# Patient Record
Sex: MALE | Race: WHITE | NOT HISPANIC OR LATINO | Employment: FULL TIME | URBAN - METROPOLITAN AREA
[De-identification: names, ages, dates, MRNs, and addresses within clinical notes are randomized per-mention and may not be internally consistent; named-entity substitution may affect disease eponyms.]

---

## 2019-06-11 ENCOUNTER — TELEPHONE (OUTPATIENT)
Dept: FAMILY MEDICINE CLINIC | Facility: CLINIC | Age: 49
End: 2019-06-11

## 2019-06-11 NOTE — TELEPHONE ENCOUNTER
Pt states he just returned from long road trip, has pain, tenderness and swelling in calf, he is concerned if he has a blood clot, he has had doppler previously in 2016, with Neg results.     He has appointment tomorrow with you at 1030am.     Pt wants to know if he should go to ER, get scan today or if he'll be able to wait until tomorrow.     Advise

## 2019-06-12 ENCOUNTER — OFFICE VISIT (OUTPATIENT)
Dept: FAMILY MEDICINE CLINIC | Facility: CLINIC | Age: 49
End: 2019-06-12

## 2019-06-12 ENCOUNTER — HOSPITAL ENCOUNTER (OUTPATIENT)
Dept: CARDIOLOGY | Facility: HOSPITAL | Age: 49
Discharge: HOME | End: 2019-06-12
Admitting: NURSE PRACTITIONER

## 2019-06-12 VITALS
HEIGHT: 71 IN | HEART RATE: 63 BPM | DIASTOLIC BLOOD PRESSURE: 72 MMHG | SYSTOLIC BLOOD PRESSURE: 126 MMHG | OXYGEN SATURATION: 100 % | WEIGHT: 216 LBS | BODY MASS INDEX: 30.24 KG/M2 | RESPIRATION RATE: 18 BRPM | TEMPERATURE: 97.8 F

## 2019-06-12 DIAGNOSIS — M79.604 PAIN OF RIGHT LOWER EXTREMITY: Primary | ICD-10-CM

## 2019-06-12 DIAGNOSIS — I80.201 DEEP VEIN THROMBOPHLEBITIS OF RIGHT LEG (HCC): ICD-10-CM

## 2019-06-12 LAB
BH CV LOW VAS RIGHT GASTRONEMIUS VESSEL: 1
BH CV LOWER VASCULAR LEFT COMMON FEMORAL AUGMENT: NORMAL
BH CV LOWER VASCULAR LEFT COMMON FEMORAL COMPETENT: NORMAL
BH CV LOWER VASCULAR LEFT COMMON FEMORAL COMPRESS: NORMAL
BH CV LOWER VASCULAR LEFT COMMON FEMORAL PHASIC: NORMAL
BH CV LOWER VASCULAR LEFT COMMON FEMORAL SPONT: NORMAL
BH CV LOWER VASCULAR RIGHT COMMON FEMORAL AUGMENT: NORMAL
BH CV LOWER VASCULAR RIGHT COMMON FEMORAL COMPETENT: NORMAL
BH CV LOWER VASCULAR RIGHT COMMON FEMORAL COMPRESS: NORMAL
BH CV LOWER VASCULAR RIGHT COMMON FEMORAL PHASIC: NORMAL
BH CV LOWER VASCULAR RIGHT COMMON FEMORAL SPONT: NORMAL
BH CV LOWER VASCULAR RIGHT DISTAL FEMORAL COMPRESS: NORMAL
BH CV LOWER VASCULAR RIGHT GASTRONEMIUS COMPRESS: NORMAL
BH CV LOWER VASCULAR RIGHT GASTRONEMIUS THROMBUS: NORMAL
BH CV LOWER VASCULAR RIGHT GREATER SAPH AK COMPRESS: NORMAL
BH CV LOWER VASCULAR RIGHT GREATER SAPH BK COMPRESS: NORMAL
BH CV LOWER VASCULAR RIGHT LESSER SAPH COMPRESS: NORMAL
BH CV LOWER VASCULAR RIGHT MID FEMORAL AUGMENT: NORMAL
BH CV LOWER VASCULAR RIGHT MID FEMORAL COMPETENT: NORMAL
BH CV LOWER VASCULAR RIGHT MID FEMORAL COMPRESS: NORMAL
BH CV LOWER VASCULAR RIGHT MID FEMORAL PHASIC: NORMAL
BH CV LOWER VASCULAR RIGHT MID FEMORAL SPONT: NORMAL
BH CV LOWER VASCULAR RIGHT PERONEAL COMPRESS: NORMAL
BH CV LOWER VASCULAR RIGHT POPLITEAL AUGMENT: NORMAL
BH CV LOWER VASCULAR RIGHT POPLITEAL COMPETENT: NORMAL
BH CV LOWER VASCULAR RIGHT POPLITEAL COMPRESS: NORMAL
BH CV LOWER VASCULAR RIGHT POPLITEAL PHASIC: NORMAL
BH CV LOWER VASCULAR RIGHT POPLITEAL SPONT: NORMAL
BH CV LOWER VASCULAR RIGHT POSTERIOR TIBIAL COMPRESS: NORMAL
BH CV LOWER VASCULAR RIGHT PROXIMAL FEMORAL COMPRESS: NORMAL
BH CV LOWER VASCULAR RIGHT SAPHENOFEMORAL JUNCTION AUGMENT: NORMAL
BH CV LOWER VASCULAR RIGHT SAPHENOFEMORAL JUNCTION COMPETENT: NORMAL
BH CV LOWER VASCULAR RIGHT SAPHENOFEMORAL JUNCTION COMPRESS: NORMAL
BH CV LOWER VASCULAR RIGHT SAPHENOFEMORAL JUNCTION PHASIC: NORMAL
BH CV LOWER VASCULAR RIGHT SAPHENOFEMORAL JUNCTION SPONT: NORMAL

## 2019-06-12 PROCEDURE — 93971 EXTREMITY STUDY: CPT

## 2019-06-12 PROCEDURE — 99214 OFFICE O/P EST MOD 30 MIN: CPT | Performed by: NURSE PRACTITIONER

## 2019-06-12 RX ORDER — ASPIRIN 81 MG/1
81 TABLET, CHEWABLE ORAL DAILY
COMMUNITY
End: 2019-06-13

## 2019-06-12 NOTE — PROGRESS NOTES
Right lower venous doppler complete and preliminary is  Positive for acute rt calf vein dvt to Rosa Cruz APRN. Pt. Was instructed to go back to the office now to be treated with blood thinners

## 2019-06-12 NOTE — PROGRESS NOTES
Subjective   Thomas Silvestre is a 49 y.o. male.     Chief Complaint   Patient presents with   • Leg Pain     possible dvt, pain in RIGHT calf      HPI patient is new to me.  He is here for right calf pain that began during the trip home from [place].  It began on the first day home of long car trip.  Once he developed the pain he started taking a baby aspirin and making sure they stopped more frequently for him to be able to walk.  He has never had a DVT, however he has had swelling in his right leg previously and had a negative Doppler for DVT approximately 2 years ago after traveling.  This time his leg did not swell, however he did have pain in calf which today is a little bit improved and he now notes it more in his distal posterior thigh.  He did not seek urgent treatment when he returned home.    He has never injured his right leg but did have ACL repair to his right knee about 15 years ago.  He has not had any problems since.  He does exercise by biking or running, however recently he has decreased his exercise quite a bit.  He is planning to resume this.    He has a fraternal twin brother who was diagnosed with DVT and PE recently.  His brother did have a work-up for familial clotting disorders, however patient is not sure if it was positive or negative and what blood work was done.  He is going to text his brother today to find this out.    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    He will be traveling out of town this weekend for approximately 1 week.    Social History     Tobacco Use   • Smoking status: Never Smoker   • Smokeless tobacco: Never Used   Substance Use Topics   • Alcohol use: No   • Drug use: No       The following portions of the patient's history were reviewed and updated as appropriate: allergies, current medications, past family history, past medical history, past social history, past surgical history and problem list.    Review of Systems   Constitutional:  "Negative for chills and fever.   Respiratory: Negative for cough, chest tightness, shortness of breath and wheezing.    Cardiovascular: Positive for leg swelling (right leg mildly swollen mostly in thigh area during trip). Negative for chest pain and palpitations.   Gastrointestinal: Negative for abdominal pain, diarrhea, nausea and vomiting.   Musculoskeletal: Negative for arthralgias, back pain, gait problem, joint swelling and myalgias.   Neurological: Negative for dizziness, weakness and headaches.   Hematological: Does not bruise/bleed easily.       Objective   Blood pressure 126/72, pulse 63, temperature 97.8 °F (36.6 °C), resp. rate 18, height 179.1 cm (70.5\"), weight 98 kg (216 lb), SpO2 100 %.    Physical Exam   Constitutional: He is oriented to person, place, and time. He appears well-developed and well-nourished. No distress.   HENT:   Head: Normocephalic and atraumatic.   Mouth/Throat: Oropharynx is clear and moist.   Eyes: Conjunctivae are normal. Right eye exhibits no discharge. Left eye exhibits no discharge.   Cardiovascular: Normal rate, regular rhythm, normal heart sounds and intact distal pulses.   Pulmonary/Chest: Effort normal and breath sounds normal. He has no wheezes.   Abdominal: Soft. Bowel sounds are normal.   Musculoskeletal: He exhibits edema (minimal right leg swelling, non pitting more apparent in distal thigh than lower leg) and tenderness (some mild point tenderness to right proximal calf and right medial mid calf). He exhibits no deformity.   Gait smooth and steady  No apparent erythema to right calf   Neurological: He is alert and oriented to person, place, and time.   Skin: Skin is warm and dry. He is not diaphoretic.   Psychiatric: He has a normal mood and affect.   Nursing note and vitals reviewed.      Assessment   Problem List Items Addressed This Visit     None      Visit Diagnoses     Pain of right lower extremity    -  Primary    Relevant Orders    Duplex Venous Lower " Extremity - Right CAR           Procedures           Impression and Plan:  We have ordered STAT doppler right lower leg to r/o DVT.  I do not see any obvious signs that would make me more likely suspect DVT other than patient history.  I do want him to find out about his twin brothers w/u and would like pt to return soon for physical.  We discussed s/s requiring emergent eval (PE).  We also discussed process for blood thinning if he has DVT.  He will return to office when Doppler is complete.     Wells probability score=1, putting him at moderate probability of DVT.    There are no preventive care reminders to display for this patient.      Late entry at 1620-patient returns from vascular and has positive result for DVT in right leg-I received a call and had patient return to office.  I will start xarelto today and discussed administration with him as well as side effects, s/s requiring emergent tx.  He will stop baby asa after today.  He took first dose of xarelto in exam room with me present.  I have given him a xarelto starter pack with instructions for use.  I will send RX for next 2 months of therapy.  He plans to travel on Sunday and since he will be anticoagulated I dont see a reason he cannot go unless he has some problems.  He will avoid exercise for at least the first month and see me back for f/u prior to completing first 3 months of tx.  We discussed that since this was provoked DVT due to travel that for now will plan 3 months of tx. He also tells me that his brothers DVT was also provoked by ankle fx.  I will get baseline CBC and CMP today while he is here.      EMR Dragon/Transcription disclaimer:   Much of this encounter note is an electronic transcription/translation of spoken language to printed text. The electronic translation of spoken language may permit erroneous, or at times, nonsensical words or phrases to be inadvertently transcribed; Although I have reviewed the note for such errors, some  may still exist.

## 2019-06-13 DIAGNOSIS — I80.201 DEEP VEIN THROMBOPHLEBITIS OF RIGHT LEG (HCC): Primary | ICD-10-CM

## 2019-06-13 PROBLEM — I82.409 DVT (DEEP VENOUS THROMBOSIS) (HCC): Status: ACTIVE | Noted: 2019-06-13

## 2019-06-13 LAB
ALBUMIN SERPL-MCNC: 4.2 G/DL (ref 3.5–5.2)
ALBUMIN/GLOB SERPL: 1.8 G/DL
ALP SERPL-CCNC: 71 U/L (ref 39–117)
ALT SERPL-CCNC: 23 U/L (ref 1–41)
AST SERPL-CCNC: 14 U/L (ref 1–40)
BASOPHILS # BLD AUTO: 0.07 10*3/MM3 (ref 0–0.2)
BASOPHILS NFR BLD AUTO: 1.1 % (ref 0–1.5)
BILIRUB SERPL-MCNC: 0.4 MG/DL (ref 0.2–1.2)
BUN SERPL-MCNC: 12 MG/DL (ref 6–20)
BUN/CREAT SERPL: 12.4 (ref 7–25)
CALCIUM SERPL-MCNC: 8.9 MG/DL (ref 8.6–10.5)
CHLORIDE SERPL-SCNC: 100 MMOL/L (ref 98–107)
CO2 SERPL-SCNC: 29.7 MMOL/L (ref 22–29)
CREAT SERPL-MCNC: 0.97 MG/DL (ref 0.76–1.27)
EOSINOPHIL # BLD AUTO: 0.26 10*3/MM3 (ref 0–0.4)
EOSINOPHIL NFR BLD AUTO: 4.1 % (ref 0.3–6.2)
ERYTHROCYTE [DISTWIDTH] IN BLOOD BY AUTOMATED COUNT: 13.2 % (ref 12.3–15.4)
GLOBULIN SER CALC-MCNC: 2.4 GM/DL
GLUCOSE SERPL-MCNC: 88 MG/DL (ref 65–99)
HCT VFR BLD AUTO: 44.5 % (ref 37.5–51)
HGB BLD-MCNC: 14.4 G/DL (ref 13–17.7)
IMM GRANULOCYTES # BLD AUTO: 0.04 10*3/MM3 (ref 0–0.05)
IMM GRANULOCYTES NFR BLD AUTO: 0.6 % (ref 0–0.5)
LYMPHOCYTES # BLD AUTO: 1.61 10*3/MM3 (ref 0.7–3.1)
LYMPHOCYTES NFR BLD AUTO: 25.2 % (ref 19.6–45.3)
MCH RBC QN AUTO: 30.5 PG (ref 26.6–33)
MCHC RBC AUTO-ENTMCNC: 32.4 G/DL (ref 31.5–35.7)
MCV RBC AUTO: 94.3 FL (ref 79–97)
MONOCYTES # BLD AUTO: 0.65 10*3/MM3 (ref 0.1–0.9)
MONOCYTES NFR BLD AUTO: 10.2 % (ref 5–12)
NEUTROPHILS # BLD AUTO: 3.75 10*3/MM3 (ref 1.7–7)
NEUTROPHILS NFR BLD AUTO: 58.8 % (ref 42.7–76)
NRBC BLD AUTO-RTO: 0 /100 WBC (ref 0–0.2)
PLATELET # BLD AUTO: 334 10*3/MM3 (ref 140–450)
POTASSIUM SERPL-SCNC: 4.3 MMOL/L (ref 3.5–5.2)
PROT SERPL-MCNC: 6.6 G/DL (ref 6–8.5)
RBC # BLD AUTO: 4.72 10*6/MM3 (ref 4.14–5.8)
SODIUM SERPL-SCNC: 140 MMOL/L (ref 136–145)
WBC # BLD AUTO: 6.38 10*3/MM3 (ref 3.4–10.8)

## 2019-06-13 NOTE — PROGRESS NOTES
Spoke in detail with Patient about results, patient expressed understanding and will follow up as agreed.     Any pending Labs and/or Diagnostic procedures required have been ordered for future release.    Nisa JOHN

## 2019-07-10 DIAGNOSIS — I80.201 DEEP VEIN THROMBOPHLEBITIS OF RIGHT LEG (HCC): Primary | ICD-10-CM

## 2019-08-08 ENCOUNTER — LAB (OUTPATIENT)
Dept: LAB | Facility: HOSPITAL | Age: 49
End: 2019-08-08

## 2019-08-08 ENCOUNTER — CONSULT (OUTPATIENT)
Dept: ONCOLOGY | Facility: CLINIC | Age: 49
End: 2019-08-08

## 2019-08-08 VITALS
TEMPERATURE: 97.9 F | WEIGHT: 220.7 LBS | OXYGEN SATURATION: 99 % | HEART RATE: 59 BPM | SYSTOLIC BLOOD PRESSURE: 131 MMHG | RESPIRATION RATE: 12 BRPM | DIASTOLIC BLOOD PRESSURE: 82 MMHG | BODY MASS INDEX: 30.9 KG/M2 | HEIGHT: 71 IN

## 2019-08-08 DIAGNOSIS — I80.201 DEEP VEIN THROMBOPHLEBITIS OF RIGHT LEG (HCC): ICD-10-CM

## 2019-08-08 DIAGNOSIS — I82.401 ACUTE DEEP VEIN THROMBOSIS (DVT) OF RIGHT LOWER EXTREMITY, UNSPECIFIED VEIN (HCC): Primary | ICD-10-CM

## 2019-08-08 DIAGNOSIS — Z79.01 CHRONIC ANTICOAGULATION: Primary | ICD-10-CM

## 2019-08-08 LAB
ALBUMIN SERPL-MCNC: 4.6 G/DL (ref 3.5–5.2)
ALBUMIN/GLOB SERPL: 1.7 G/DL (ref 1.1–2.4)
ALP SERPL-CCNC: 65 U/L (ref 38–116)
ALT SERPL W P-5'-P-CCNC: 21 U/L (ref 0–41)
ANION GAP SERPL CALCULATED.3IONS-SCNC: 10.7 MMOL/L (ref 5–15)
AST SERPL-CCNC: 15 U/L (ref 0–40)
AT III PPP CHRO-ACNC: 108 % (ref 90–134)
BASOPHILS # BLD AUTO: 0.09 10*3/MM3 (ref 0–0.2)
BASOPHILS NFR BLD AUTO: 1.6 % (ref 0–1.5)
BILIRUB SERPL-MCNC: 0.4 MG/DL (ref 0.2–1.2)
BUN BLD-MCNC: 11 MG/DL (ref 6–20)
BUN/CREAT SERPL: 10.8 (ref 7.3–30)
CALCIUM SPEC-SCNC: 9.2 MG/DL (ref 8.5–10.2)
CHLORIDE SERPL-SCNC: 102 MMOL/L (ref 98–107)
CO2 SERPL-SCNC: 29.3 MMOL/L (ref 22–29)
CREAT BLD-MCNC: 1.02 MG/DL (ref 0.7–1.3)
DEPRECATED RDW RBC AUTO: 42.1 FL (ref 37–54)
EOSINOPHIL # BLD AUTO: 0.26 10*3/MM3 (ref 0–0.4)
EOSINOPHIL NFR BLD AUTO: 4.6 % (ref 0.3–6.2)
ERYTHROCYTE [DISTWIDTH] IN BLOOD BY AUTOMATED COUNT: 12.7 % (ref 12.3–15.4)
F5 GENE MUT ANL BLD/T: NORMAL
FACTOR II, DNA ANALYSIS: NORMAL
GFR SERPL CREATININE-BSD FRML MDRD: 77 ML/MIN/1.73
GLOBULIN UR ELPH-MCNC: 2.7 GM/DL (ref 1.8–3.5)
GLUCOSE BLD-MCNC: 96 MG/DL (ref 74–124)
HCT VFR BLD AUTO: 43.4 % (ref 37.5–51)
HGB BLD-MCNC: 14.9 G/DL (ref 13–17.7)
IMM GRANULOCYTES # BLD AUTO: 0.03 10*3/MM3 (ref 0–0.05)
IMM GRANULOCYTES NFR BLD AUTO: 0.5 % (ref 0–0.5)
LYMPHOCYTES # BLD AUTO: 1.78 10*3/MM3 (ref 0.7–3.1)
LYMPHOCYTES NFR BLD AUTO: 31.4 % (ref 19.6–45.3)
MCH RBC QN AUTO: 31 PG (ref 26.6–33)
MCHC RBC AUTO-ENTMCNC: 34.3 G/DL (ref 31.5–35.7)
MCV RBC AUTO: 90.4 FL (ref 79–97)
MONOCYTES # BLD AUTO: 0.63 10*3/MM3 (ref 0.1–0.9)
MONOCYTES NFR BLD AUTO: 11.1 % (ref 5–12)
NEUTROPHILS # BLD AUTO: 2.87 10*3/MM3 (ref 1.7–7)
NEUTROPHILS NFR BLD AUTO: 50.8 % (ref 42.7–76)
NRBC BLD AUTO-RTO: 0 /100 WBC (ref 0–0.2)
PLATELET # BLD AUTO: 269 10*3/MM3 (ref 140–450)
PMV BLD AUTO: 9.3 FL (ref 6–12)
POTASSIUM BLD-SCNC: 4.2 MMOL/L (ref 3.5–4.7)
PROT C ACT/NOR PPP: 110 % (ref 86–163)
PROT S ACT/NOR PPP: 139 % (ref 70–127)
PROT S FREE PPP-ACNC: 104 % (ref 49–138)
PROT SERPL-MCNC: 7.3 G/DL (ref 6.3–8)
RBC # BLD AUTO: 4.8 10*6/MM3 (ref 4.14–5.8)
SODIUM BLD-SCNC: 142 MMOL/L (ref 134–145)
WBC NRBC COR # BLD: 5.66 10*3/MM3 (ref 3.4–10.8)

## 2019-08-08 PROCEDURE — 85306 CLOT INHIBIT PROT S FREE: CPT | Performed by: INTERNAL MEDICINE

## 2019-08-08 PROCEDURE — 99244 OFF/OP CNSLTJ NEW/EST MOD 40: CPT | Performed by: INTERNAL MEDICINE

## 2019-08-08 PROCEDURE — 85300 ANTITHROMBIN III ACTIVITY: CPT | Performed by: INTERNAL MEDICINE

## 2019-08-08 PROCEDURE — 80053 COMPREHEN METABOLIC PANEL: CPT | Performed by: INTERNAL MEDICINE

## 2019-08-08 PROCEDURE — 85303 CLOT INHIBIT PROT C ACTIVITY: CPT | Performed by: INTERNAL MEDICINE

## 2019-08-08 PROCEDURE — 81240 F2 GENE: CPT | Performed by: INTERNAL MEDICINE

## 2019-08-08 PROCEDURE — 36415 COLL VENOUS BLD VENIPUNCTURE: CPT

## 2019-08-08 PROCEDURE — 81241 F5 GENE: CPT | Performed by: INTERNAL MEDICINE

## 2019-08-08 PROCEDURE — 85025 COMPLETE CBC W/AUTO DIFF WBC: CPT

## 2019-08-08 NOTE — PROGRESS NOTES
Subjective     REASON FOR CONSULTATION:  RIGHT LOWER EXTREMITY GASTROCNEMIUS VEIN THROMBOSIS ASSOCIATED WITH LONG TRIP BY CAR.  Provide an opinion on any further workup or treatment                             REQUESTING PHYSICIAN:  DAHLIA VARELA    RECORDS OBTAINED:  Records of the patients history including those obtained from the referring provider were reviewed and summarized in detail.    HISTORY OF PRESENT ILLNESS:  The patient is a 49 y.o. year old male who is here for an opinion about the above issue.    History of Present Illness I have been asked to see this patient in consultation today a 49-year-old extremely healthy individual who took a family trip alone to [place] with his family and on the way back after driving for multiple hours and not moving too much in the car developed pain in the upper aspect of the right calf with local inflammation and sensitivity. When he got to [place] he was seen and a Doppler study was done documenting a vein clot in the gastrocnemius vein in the right lower extremity. He had no extension into the popliteal system. The rest of the Doppler of the lower extremity was negative. The patient had no symptoms consistent with pulmonary embolism. He was placed on Xarelto and 2 days later the pain resolved. He has occasional discomfort since then in the leg especially on days that he is too busy and walking. He has not had any discoloration of the toes. He has not had any clinical bleeding. The patient denies the use of any testosterone. The patient otherwise feels well, his weight is stable, appetite is good. He has not had any infections. Normal bowel and urinary activity, no cardiovascular or respiratory issues of any kind. No skin alterations, no joint pain. No neurological symptomatology.         Past Medical History:   Diagnosis Date   • DVT (deep venous thrombosis) (CMS/HCC)         Past Surgical History:   Procedure Laterality Date   • KNEE ACL RECONSTRUCTION      • OTHER SURGICAL HISTORY      cyst removed   • TOE SURGERY     • VASECTOMY          Current Outpatient Medications on File Prior to Visit   Medication Sig Dispense Refill   • rivaroxaban (XARELTO) 20 MG tablet Take 1 tablet by mouth Daily With Dinner. Begin after starter pack complete 30 tablet 1   • Rivaroxaban (XARELTO) tablet therapy pack starter pack Take one 15 mg tablet twice daily with food for 21 days.  Followed by one 20 mg tablet by mouth once daily with food. Take as directed 1 each 0     No current facility-administered medications on file prior to visit.         ALLERGIES:    Allergies   Allergen Reactions   • No Known Drug Allergy         Social History     Socioeconomic History   • Marital status:      Spouse name: Not on file   • Number of children: Not on file   • Years of education: Not on file   • Highest education level: Not on file   Tobacco Use   • Smoking status: Never Smoker   • Smokeless tobacco: Never Used   Substance and Sexual Activity   • Alcohol use: No   • Drug use: No   • Sexual activity: Defer        Family History   Problem Relation Age of Onset   • Hypertension Father         Review of Systems       General: no fever, no chills, no fatigue,no weight changes, no lack of appetite.  Eyes: no epiphora, xerophthalmia,conjunctivitis, pain, glaucoma, blurred vision, blindness, secretion, photophobia, proptosis, diplopia.  Ears: no otorrhea, tinnitus, otorrhagia, deafness, pain, vertigo.  Nose: no rhinorrhea, no epistaxis, no alteration in perception of odors, no sinuses pressure.  Mouth: no alteration in gums or teeth,  No ulcers, no difficulty with mastication or deglut ion, no odynophagia.  Neck: no masses or pain, no thyroid alterations, no pain in muscles or arteries, no carotid odynia, no crepitation.  Respiratory: no cough, no sputum production,no dyspnea,no trepopnea, no pleuritic pain,no hemoptysis.  Heart: no syncope, no irregularity, no palpitations, no angina,no  "orthopnea,no paroxysmal nocturnal dyspnea.  Vascular Venous: STATED tenderness, edema, palpable cords,no postphlebitic syndrome, no skin changes no ulcerations.  Vascular Arterial: no distal ischemia, noclaudication, no gangrene, no neuropathic ischemic pain, no skin ulcers, no paleness no cyanosis.  GI: no dysphagia, no odynophagia, no regurgitation, no heartburn,no indigestion,no nausea,no vomiting,no hematemesis ,no melena,no jaundice,no distention, no obstipation,no enterorrhagia,no proctalgia,no anal  lesions, no changes in bowel habits.  : no frequency, no hesitancy, no hematuria, no discharge,no  pain.  Musculoskeletal: no muscle or tendon pain or inflammation,no  joint pain, no edema, no functional limitation,no fasciculations, no mass.  Neurologic: no headache, no seizures, noalterations on Craneal nerves, no motor deficit, no sensory deficit, normal coordination, no alteration in memory,normal orientation, calculation,normal writting, verbal and written language.  Skin: no rashes,no pruritus no localized lesions.  Psychiatric: no anxiety, no depression,no agitation, no delusions, proper insight.      Objective     Vitals:    08/08/19 0841   BP: 131/82   Pulse: 59   Resp: 12   Temp: 97.9 °F (36.6 °C)   TempSrc: Oral   SpO2: 99%   Weight: 100 kg (220 lb 11.2 oz)   Height: 180 cm (70.87\")  Comment: new height   PainSc: 0-No pain     Current Status 8/8/2019   ECOG score 0       Physical Exam    GENERAL:  Well-developed, well-nourished  Patient  in no acute distress.   SKIN:  Warm, dry ,NO rashes,NO purpura ,NO petechiae.  HEENT:  Pupils were equal and reactive to light and accomodation, conjunctivas non injected, no pterigion, normal extraocular movements, normal visual acuity.   Mouth mucosa was moist, no exudates in oropharynx, normal gum line, normal roof of the mouth and pillars, normal papillations of the tongue.  NECK:  Supple with good range of motion; no thyromegaly or masses, no JVD or bruits, no " cervical adenopathies.No carotid arteries pain, no carotid abnormal pulsation , NO arterial dance.  LYMPHATICS:  No cervical, NO supraclavicular, NO axillary,NO epitrochlear , NO inguinal adenopathy.  CHEST:  Normal excursion of both darwin thoraces, normal voice fremitus, no subcutaneous emphysema, normal axillas, no rashes or acanthosis nigricans. Lungs clear to percussion and auscultation, normal breath sounds bilaterally, no wheezing,NO crackles NO ronchi, NO stridor, NO rubs.  CARDIAC AND VASCULAR:  normal rate and regular rhythm, without murmurs,NO rubs NO S3 NO S4 right or left . Normal femoral, popliteal, pedis, brachial and carotid pulses.  ABDOMEN:  Soft, nontender with no organomegaly or masses, no ascites, no collateral circulation,no distention,no Gerardo sign, no abdominal pain, no inguinal hernias,no umbilical hernia, no abdominal bruits. Normal bowel sounds.  GENITAL: Not  Performed.  EXTREMITIES  AND SPINE:  No clubbing, cyanosis or edema, no deformities or pain .No kyphosis, scoliosis, deformities or pain in spine, ribs or pelvic bone.  NEUROLOGICAL:  Patient was awake, alert, oriented to time, person and place.        RECENT LABS:  Hematology WBC   Date Value Ref Range Status   08/08/2019 5.66 3.40 - 10.80 10*3/mm3 Final   06/12/2019 6.38 3.40 - 10.80 10*3/mm3 Final     RBC   Date Value Ref Range Status   08/08/2019 4.80 4.14 - 5.80 10*6/mm3 Final   06/12/2019 4.72 4.14 - 5.80 10*6/mm3 Final     Hemoglobin   Date Value Ref Range Status   08/08/2019 14.9 13.0 - 17.7 g/dL Final     Hematocrit   Date Value Ref Range Status   08/08/2019 43.4 37.5 - 51.0 % Final     Platelets   Date Value Ref Range Status   08/08/2019 269 140 - 450 10*3/mm3 Final      Interpretation Summary     · Acute right lower extremity deep vein thrombosis noted in the gastrocnemius/soleal.  · All other right sided veins appeared normal.      Patient Hx Of Height, Weight, and Vitals     Height Weight BSA (Calculated - sq m) BMI  "(kg/m2) Pulse BP   179.1 cm (70.5\") 98 kg (216 lb) 2.17 sq meters 30.62 63 126/72   Study Impression     • Right Common Femoral: No deep vein thrombosis noted.   • Right Saphenofemoral Junction: No superficial thrombophlebitis noted.   • Right Proximal Femoral: No deep vein thrombosis noted.   • Right Mid Femoral: No deep vein thrombosis noted.   • Right Distal Femoral: No deep vein thrombosis noted.   • Right Popliteal: No deep vein thrombosis noted.   • Right Posterior Tibial: No deep vein thrombosis noted.   • Right Peroneal: No deep vein thrombosis noted.   • Right Gastrocnemius Soleal: Acute deep vein thrombosis noted.   • Right Greater Saphenous Above Knee: No superficial thrombophlebitis noted.   • Right Greater Saphenous Below Knee: No superficial thrombophlebitis noted.   • Left Common Femoral: No deep vein thrombosis noted         Assessment/Plan  In summary this very healthy 49-year-old white male who has had a blood clot in the gastrocnemius vein in the right lower extremity that was related to a long trip by car to [place]. The patient since initiation of Xarelto has done well and he had resolution of the symptoms 2 days after the initiation of the medicine. So far he has not had any symptoms that suggest postphlebitic syndrome, occasional discomfort at the clot site but he has not had any other problems.     The patient has had a brother who after twisting an ankle developed a significant vein thrombosis in the lower extremity that ended up with pulmonary embolism. According to the patient he has had blood testing that disclosed no abnormalities. Neither the patient's father or mother who are still alive have had blood clots. The patient has a sister who never had blood clots. He has 2 children who never have had blood clots. Therefore under the present premises I do believe that the patient will require continuation of Xarelto at least for a total of 3 months and I have sent him back to the lab to " do thrombophilia labs. This will include antithrombin-III level, factor V Leiden mutation, prothrombin gene mutation, protein C, protein S, lupus anticoagulant, anticardiolipin antibody profile.    I sent a new prescription for Xarelto 20 mg a day.     I advised the patient in regard to the need for protection from bleeding and trauma.     I will review him back in 2 weeks, review his laboratory parameters and make further suggestions.     I asked him to wear copper socks on his lower extremities and in future traveling trips he will require proper hydration and proper mobilization to minimize any further problems.     I discussed with patient the need for future precautions to avoid another episode of thrombosis due to thrombophilia including: Propper hydration during trips, loose clothing, frequent movement of calf, propper use of prophylactic medications, discussion with other providers about the need of communication about prophylaxis in case of need for any other surgical intervention, avoidance of hormones including androgens, estrogens and progestins, propper diet and sindy control, discontinuation of smoking, propper weight maintenance, periodic follow up in the office.I also educated patient about clinical presentation of blood cloths in Lower extremities as well Pulmonary emboli, and with this in mind, if any of the symptoms or signs arise to immediate to go to the [facility] [place] ER of make a phone call to our office, services available 24 hours a day, 365 days a year.

## 2019-08-09 LAB
CARDIOLIPIN IGG SER IA-ACNC: <9 GPL U/ML (ref 0–14)
CARDIOLIPIN IGM SER IA-ACNC: <9 MPL U/ML (ref 0–12)

## 2019-08-10 LAB
B2 GLYCOPROT1 IGA SER-ACNC: <9 GPI IGA UNITS (ref 0–25)
B2 GLYCOPROT1 IGG SER-ACNC: <9 GPI IGG UNITS (ref 0–20)
B2 GLYCOPROT1 IGM SER-ACNC: <9 GPI IGM UNITS (ref 0–32)

## 2019-08-12 LAB
DRVVT IMM 1:2 NP PPP: 68.7 SEC (ref 0–47)
LA NT PLATELET PPP: 39.4 SEC (ref 0–51.9)
LUPUS ANTICOAGULANT REFLEX: ABNORMAL
SCREEN DRVVT: 1.7 RATIO (ref 0.8–1.2)
SCREEN DRVVT: 95.4 SEC (ref 0–47)

## 2019-08-23 ENCOUNTER — APPOINTMENT (OUTPATIENT)
Dept: LAB | Facility: HOSPITAL | Age: 49
End: 2019-08-23

## 2019-08-23 ENCOUNTER — OFFICE VISIT (OUTPATIENT)
Dept: ONCOLOGY | Facility: CLINIC | Age: 49
End: 2019-08-23

## 2019-08-23 VITALS
HEIGHT: 71 IN | HEART RATE: 55 BPM | DIASTOLIC BLOOD PRESSURE: 80 MMHG | SYSTOLIC BLOOD PRESSURE: 121 MMHG | BODY MASS INDEX: 30.55 KG/M2 | WEIGHT: 218.2 LBS | TEMPERATURE: 98.6 F | RESPIRATION RATE: 12 BRPM | OXYGEN SATURATION: 98 %

## 2019-08-23 DIAGNOSIS — I82.401 ACUTE DEEP VEIN THROMBOSIS (DVT) OF RIGHT LOWER EXTREMITY, UNSPECIFIED VEIN (HCC): Primary | ICD-10-CM

## 2019-08-23 LAB
BASOPHILS # BLD AUTO: 0.06 10*3/MM3 (ref 0–0.2)
BASOPHILS NFR BLD AUTO: 0.9 % (ref 0–1.5)
DEPRECATED RDW RBC AUTO: 42.2 FL (ref 37–54)
EOSINOPHIL # BLD AUTO: 0.13 10*3/MM3 (ref 0–0.4)
EOSINOPHIL NFR BLD AUTO: 2 % (ref 0.3–6.2)
ERYTHROCYTE [DISTWIDTH] IN BLOOD BY AUTOMATED COUNT: 12.8 % (ref 12.3–15.4)
HCT VFR BLD AUTO: 44.4 % (ref 37.5–51)
HGB BLD-MCNC: 15.3 G/DL (ref 13–17.7)
IMM GRANULOCYTES # BLD AUTO: 0.02 10*3/MM3 (ref 0–0.05)
IMM GRANULOCYTES NFR BLD AUTO: 0.3 % (ref 0–0.5)
LYMPHOCYTES # BLD AUTO: 1.66 10*3/MM3 (ref 0.7–3.1)
LYMPHOCYTES NFR BLD AUTO: 26.1 % (ref 19.6–45.3)
MCH RBC QN AUTO: 31 PG (ref 26.6–33)
MCHC RBC AUTO-ENTMCNC: 34.5 G/DL (ref 31.5–35.7)
MCV RBC AUTO: 90.1 FL (ref 79–97)
MONOCYTES # BLD AUTO: 0.64 10*3/MM3 (ref 0.1–0.9)
MONOCYTES NFR BLD AUTO: 10.1 % (ref 5–12)
NEUTROPHILS # BLD AUTO: 3.84 10*3/MM3 (ref 1.7–7)
NEUTROPHILS NFR BLD AUTO: 60.6 % (ref 42.7–76)
NRBC BLD AUTO-RTO: 0 /100 WBC (ref 0–0.2)
PLATELET # BLD AUTO: 313 10*3/MM3 (ref 140–450)
PMV BLD AUTO: 9.2 FL (ref 6–12)
RBC # BLD AUTO: 4.93 10*6/MM3 (ref 4.14–5.8)
WBC NRBC COR # BLD: 6.35 10*3/MM3 (ref 3.4–10.8)

## 2019-08-23 PROCEDURE — 36416 COLLJ CAPILLARY BLOOD SPEC: CPT | Performed by: INTERNAL MEDICINE

## 2019-08-23 PROCEDURE — 99213 OFFICE O/P EST LOW 20 MIN: CPT | Performed by: INTERNAL MEDICINE

## 2019-08-23 PROCEDURE — 85025 COMPLETE CBC W/AUTO DIFF WBC: CPT | Performed by: INTERNAL MEDICINE

## 2019-08-23 NOTE — PROGRESS NOTES
Subjective     REASON FOR FOLLOW UP:  RIGHT LOWER EXTREMITY GASTROCNEMIUS VEIN THROMBOSIS ASSOCIATED WITH LONG TRIP BY CAR.      History of Present Illness I have been asked to see this patient in consultation today a 49-year-old extremely healthy individual who took a family trip alone to [place] with his family and on the way back after driving for multiple hours and not moving too much in the car developed pain in the upper aspect of the right calf with local inflammation and sensitivity. When he got to [place] he was seen and a Doppler study was done documenting a vein clot in the gastrocnemius vein in the right lower extremity. He had no extension into the popliteal system. The rest of the Doppler of the lower extremity was negative. The patient had no symptoms consistent with pulmonary embolism. He was placed on Xarelto and 2 days later the pain resolved. He has occasional discomfort since then in the leg especially on days that he is too busy and walking. He has not had any discoloration of the toes. He has not had any clinical bleeding. The patient denies the use of any testosterone. The patient otherwise feels well, his weight is stable, appetite is good. He has not had any infections. Normal bowel and urinary activity, no cardiovascular or respiratory issues of any kind. No skin alterations, no joint pain. No neurological symptomatology.     The patient returned to the office on 08/23/2019 in order to review his thrombophilia labs. In the interim he has not had any new issues in his right lower extremity, he remains on his Xarelto and he has not had any clinical bleeding. His appetite, weight, bowel function and urination remain normal. No cardiovascular or respiratory issues, no pain, no fever, no infections.         Past Medical History:   Diagnosis Date   • DVT (deep venous thrombosis) (CMS/HCC)         Past Surgical History:   Procedure Laterality Date   • KNEE ACL RECONSTRUCTION  2002   • OTHER  SURGICAL HISTORY      cyst removed   • TOE SURGERY  2015   • VASECTOMY          Current Outpatient Medications on File Prior to Visit   Medication Sig Dispense Refill   • rivaroxaban (XARELTO) 20 MG tablet Take 1 tablet by mouth Daily With Dinner. 30 tablet 2     No current facility-administered medications on file prior to visit.         ALLERGIES:    Allergies   Allergen Reactions   • No Known Drug Allergy         Social History     Socioeconomic History   • Marital status:      Spouse name: Shima   • Number of children: 2   • Years of education: College   • Highest education level: Not on file   Occupational History   • Occupation: IT     Employer: [employer]   Tobacco Use   • Smoking status: Never Smoker   • Smokeless tobacco: Never Used   Substance and Sexual Activity   • Alcohol use: Yes     Comment: Occasional   • Drug use: No   • Sexual activity: Defer        Family History   Problem Relation Age of Onset   • Hypertension Father    • Pulmonary embolism Brother         Review of Systems         General: no fever, no chills, no fatigue,no weight changes, no lack of appetite.  Eyes: no epiphora, xerophthalmia,conjunctivitis, pain, glaucoma, blurred vision, blindness, secretion, photophobia, proptosis, diplopia.  Ears: no otorrhea, tinnitus, otorrhagia, deafness, pain, vertigo.  Nose: no rhinorrhea, no epistaxis, no alteration in perception of odors, no sinuses pressure.  Mouth: no alteration in gums or teeth,  No ulcers, no difficulty with mastication or deglut ion, no odynophagia.  Neck: no masses or pain, no thyroid alterations, no pain in muscles or arteries, no carotid odynia, no crepitation.  Respiratory: no cough, no sputum production,no dyspnea,no trepopnea, no pleuritic pain,no hemoptysis.  Heart: no syncope, no irregularity, no palpitations, no angina,no orthopnea,no paroxysmal nocturnal dyspnea.  Vascular Venous: no tenderness,no edema,no palpable cords,no postphlebitic  "syndrome, no skin changes no ulcerations.  Vascular Arterial: no distal ischemia, noclaudication, no gangrene, no neuropathic ischemic pain, no skin ulcers, no paleness no cyanosis.  GI: no dysphagia, no odynophagia, no regurgitation, no heartburn,no indigestion,no nausea,no vomiting,no hematemesis ,no melena,no jaundice,no distention, no obstipation,no enterorrhagia,no proctalgia,no anal  lesions, no changes in bowel habits.  : no frequency, no hesitancy, no hematuria, no discharge,no  pain.  Musculoskeletal: no muscle or tendon pain or inflammation,no  joint pain, no edema, no functional limitation,no fasciculations, no mass.  Neurologic: no headache, no seizures, noalterations on Craneal nerves, no motor deficit, no sensory deficit, normal coordination, no alteration in memory,normal orientation, calculation,normal writting, verbal and written language.  Skin: no rashes,no pruritus no localized lesions.  Psychiatric: no anxiety, no depression,no agitation, no delusions, proper insight.        Objective     Vitals:    08/23/19 1341   BP: 121/80   Pulse: 55   Resp: 12   Temp: 98.6 °F (37 °C)   TempSrc: Oral   SpO2: 98%   Weight: 99 kg (218 lb 3.2 oz)   Height: 180 cm (70.87\")   PainSc: 0-No pain     Current Status 8/23/2019   ECOG score 0       Physical Exam        GENERAL:  Well-developed, well-nourished  Patient  in no acute distress.   SKIN:  Warm, dry ,NO rashes,NO purpura ,NO petechiae.  HEENT:  Pupils were equal and reactive to light and accomodation, conjunctivas non injected, no pterigion, normal extraocular movements, normal visual acuity.   Mouth mucosa was moist, no exudates in oropharynx, normal gum line, normal roof of the mouth and pillars, normal papillations of the tongue.  NECK:  Supple with good range of motion; no thyromegaly or masses, no JVD or bruits, no cervical adenopathies.No carotid arteries pain, no carotid abnormal pulsation , NO arterial dance.  LYMPHATICS:  No cervical, NO " supraclavicular, NO axillary,NO epitrochlear , NO inguinal adenopathy.  CHEST:  Normal excursion of both darwin thoraces, normal voice fremitus, no subcutaneous emphysema, normal axillas, no rashes or acanthosis nigricans. Lungs clear to percussion and auscultation, normal breath sounds bilaterally, no wheezing,NO crackles NO ronchi, NO stridor, NO rubs.  CARDIAC AND VASCULAR:  normal rate and regular rhythm, without murmurs,NO rubs NO S3 NO S4 right or left . Normal femoral, popliteal, pedis, brachial and carotid pulses.  ABDOMEN:  Soft, nontender with no organomegaly or masses, no ascites, no collateral circulation,no distention,no Gerardo sign, no abdominal pain, no inguinal hernias,no umbilical hernia, no abdominal bruits. Normal bowel sounds.  GENITAL: Not  Performed.  EXTREMITIES  AND SPINE:  No clubbing, cyanosis or edema, no deformities or pain .No kyphosis, scoliosis, deformities or pain in spine, ribs or pelvic bone.  NEUROLOGICAL:  Patient was awake, alert, oriented to time, person and place.              RECENT LABS:  Hematology WBC   Date Value Ref Range Status   08/23/2019 6.35 3.40 - 10.80 10*3/mm3 Final   06/12/2019 6.38 3.40 - 10.80 10*3/mm3 Final     RBC   Date Value Ref Range Status   08/23/2019 4.93 4.14 - 5.80 10*6/mm3 Final   06/12/2019 4.72 4.14 - 5.80 10*6/mm3 Final     Hemoglobin   Date Value Ref Range Status   08/23/2019 15.3 13.0 - 17.7 g/dL Final     Hematocrit   Date Value Ref Range Status   08/23/2019 44.4 37.5 - 51.0 % Final     Platelets   Date Value Ref Range Status   08/23/2019 313 140 - 450 10*3/mm3 Final            Antithrombin Activity  90 - 134 % 108                Beta-2 Glycoprotein Antibodies   Order: [id number]   Collected:  8/8/2019 09:23   View Full Report  Component  Ref Range & Units 2wk ago   Beta-2 Glyco 1 IgG  0 - 20 GPI IgG units <9    Comment: The reference interval reflects a 3SD or 99th percentile interval,   which is thought to represent a potentially clinically  significant   result in accordance with the International Consensus Statement on   the classification criteria for definitive antiphospholipid syndrome   (APS). J Thromb Haem 2006;4:295-306.   Beta-2 Glyco 1 IgA  0 - 25 GPI IgA units <9    Comment: The reference interval reflects a 3SD or 99th percentile interval,   which is thought to represent a potentially clinically significant   result in accordance with the International Consensus Statement on   the classification criteria for definitive antiphospholipid syndrome   (APS). J Thromb Haem 2006;4:295-306.   Beta-2 Glyco 1 IgM  0 - 32 GPI IgM units <9    Comment: The reference interval reflects a 3SD or 99th percentile interval,   which is thought to represent a potentially clinically significant   result in accordance with the International Consensus Statement on   the classification criteria for definitive antiphospholipid syndrome   (APS). J Thromb Haem 2006;4:295-306.      Narrative     Performed at:  [address] [place] [address]  [id number]  : Ivonne Gaines MD, Phone:  [phone]         Related Result Highlights         Anticardiolipin Antibody, IgG / M, Qn  Final result 8/8/2019                  Contains abnormal data Lupus Anticoagulant Reflex   Order: [id number]   Collected:  8/8/2019 09:23   View Full Report  Component  Ref Range & Units 2wk ago   PTT Lupus Anticoagulant  0.0 - 51.9 sec 39.4    Dilute Viper Venom Time  0.0 - 47.0 sec 95.4 Abnormally high     Lupus Anticoagulant Reflex Comment:    Comment: Results are consistent with the presence of a lupus anticoagulant.   NOTE: Only persistent lupus anticoagulants are thought to be of clinical   significance. For this reason, repeat testing in 12 or more weeks after an   initial positive result should be considered to confirm or refute the   presence of a lupus anticoagulant, depending on clinical presentation.   Results of lupus anticoagulant tests may be falsely  positive in the   presence of certain anticoagulant therapies.      Narrative     Performed at:  [address] [place] [address]  [id number]  : Ivonne Gaines MD, Phone:  [phone]            Contains abnormal data Reflexed DRVVT Mix   Order: [id number] - Reflex for Order [id number]   Collected:  8/8/2019 09:23   View Full Report  Component  Ref Range & Units 2wk ago   Dilute Viper Venom 1:1 Mix  0.0 - 47.0 sec 68.7 Abnormally high        Narrative     Performed at:  [address] [place] [address]  [id number]  : Ivonne Gaines MD, Phone:  [phone]            Contains abnormal data Reflexed DRVVT Confirm   Order: [id number] - Reflex for Order [id number]   Collected:  8/8/2019 09:23   View Full Report  Component  Ref Range & Units 2wk ago   Dilute Viper Venom Time  0.8 - 1.2 ratio 1.7 Abnormally high        Narrative     Performed at:  [address] [place] [address]  [id number]  : Ivonne Gaines MD, Phone:  [phone]            Anticardiolipin Antibody, IgG / M, Qn   Order: [id number]   Collected:  8/8/2019 09:23   View Full Report  Component  Ref Range & Units 2wk ago   Anticardiolipin IgG  0 - 14 GPL U/mL <9    Comment:                           Negative:              <15                             Indeterminate:     15 - 20                             Low-Med Positive: >20 - 80                             High Positive:         >80   Anticardiolipin IgM  0 - 12 MPL U/mL <9    Comment:                           Negative:              <13                             Indeterminate:     13 - 20                             Low-Med Positive: >20 - 80                             High Positive:         >80      Narrative     Performed at:  [address] [place] [address]  [id number]  : Morris Walker PhD, Phone:  [phone]         Related Result Highlights         Beta-2  Glycoprotein Antibodies  Final result 8/8/2019                  Protein S Antigen, Free   Order: [id number]   Collected:  8/8/2019 09:23   View Full Report  Component  Ref Range & Units 2wk ago   Protein S Ag, Free  49.0 - 138.0 % 104.0       Narrative     Deficiency of Protein S is associated with a high risk of developing venous thromboembolism, especially in young adults.  Acquired deficiencies of Protein S are associated with pregnancy, hepatic disorder, oral anticoagulant therapy, disseminated intravascular coagulation (DIC), newborns, and other clinical conditions.            Contains abnormal data Protein S Functional   Order: [id number]   Collected:  8/8/2019 09:23   View Full Report  Component  Ref Range & Units 2wk ago   Protein S Functional  70 - 127 % 139 Abnormally high        Narrative     Lupus anticoagulants and/or anti-phospholipid antibodies (APA) have been noted to interfere in the assay. Highly elevated levels of Factor VIII (greater than 250%) may lead to an under-estimation of the functional protein S level. Thrombin inhibitors and heparin may lead to an over-estimation of the functional protein S level.            Protein C Activity   Order: [id number]   Collected:  8/8/2019 09:23   View Full Report  Component  Ref Range & Units 2wk ago   Protein C Activity  86 - 163 % 110       Narrative     Deficiency of Protein C is associated with recurrent venous thrombosis, especially in young adults.  Acquired deficiencies of Protein C are associated with hepatic disorder, oral anticoagulant therapy, and disseminated intravascular coagulation (DIC).            Factor II, DNA Analysis   Order: [id number]   Collected:  8/8/2019 09:23   View Full Report  Component  Ref Range & Units 2wk ago   Factor II, DNA Analysis  Normal Normal       Narrative     A point mutation ([id number]) in the factor II (prothrombin) gene is the second most common cause of inherited thrombophilia. The incidence of this mutation in the  [place]  population is about 2% and in the  population it is approximately 0.5%. This mutation is rare in the  and  population. Being heterozygous for a prothrombin mutation increases the risk for developing venous thrombosis about 2 to 3 times above the general population risk. Being homozygous for the prothrombin gene mutation increases the relative risk for venous thrombosis further, although it is not yet known how   much further the risk is increased. In women heterozygous for the prothrombin gene mutation, the use of estrogen containing oral contraceptives increases the relative risk of venous thrombosis about 16 times and the risk of developing cerebral thrombosis is also significantly increased. In pregnancy, the prothrombin gene mutation increases risk for venous thrombosis and may increase risk for stillbirth, placental abruption, pre-eclampsia and fetal growth restriction.     The expression of Factor II thrombophilia is impacted by coexisting genetic thrombophilic disorders, acquired thrombophilic disorders (eg, malignancy, hyperhomocysteinemia, high factor VIII levels), and circumstances including: pregnancy, oral contraceptive use, hormone replacement therapy, selective estrogen receptor modulators, travel, central venous catheters, surgery, and organ transplantation.            Factor 5 Leiden   Order: [id number]   Collected:  8/8/2019 09:23   View Full Report  Component  Ref Range & Units 2wk ago   Factor V Leiden  Normal Normal       Narrative     Factor V Leiden is a specific mutation (R506Q) in the factor V gene that is associated with an increased risk of venous thrombosis. Factor V Leiden is more resistant to inactivation by activated protein C.  As a result, factor V persists in the circulation leading to a mild hyper-   coagulable state.  The Leiden mutation accounts for 90% - 95% of APC resistance.  Factor V Leiden has been reported in patients with  deep vein thrombosis, pulmonary embolus,   central retinal vein occlusion, cerebral sinus thrombosis and hepatic vein thrombosis. Other risk factors to be considered in the workup for venous thrombosis include the [id number] mutation in the factor II (prothrombin) gene, protein S and C deficiency, and antithrombin deficiencies.   Anticardiolipin antibody and lupus anticoagulant analysis may be appropriate for certain patients, as well as homocysteine levels.    The expression of Factor V Leiden thrombophilia is impacted by coexisting genetic thrombophilic disorders, acquired thrombophilic disorders (malignancy, hyperhomocysteinemia, high factor VIII levels), and circumstances including: pregnancy, oral contraceptive use, hormone replacement therapy, selective estrogen receptor modulators, travel, central venous catheters, surgery, transplantation and advanced age.            Contains abnormal data Reflexed DRVVT Confirm   Order: [id number] - Reflex for Order [id number]   Collected:  8/8/2019 09:23   View Full Report  Component  Ref Range & Units 2wk ago   Dilute Viper Venom Time  0.8 - 1.2 ratio 1.7 Abnormally high        Narrative     Performed at:  [address] [place] [address]  [id number]  : Ivonne Gaines MD, Phone:  [phone]            Contains abnormal data Lupus Anticoagulant Reflex   Order: [id number]   Collected:  8/8/2019 09:23   View Full Report  Component  Ref Range & Units 2wk ago   PTT Lupus Anticoagulant  0.0 - 51.9 sec 39.4    Dilute Viper Venom Time  0.0 - 47.0 sec 95.4 Abnormally high     Lupus Anticoagulant Reflex Comment:    Comment: Results are consistent with the presence of a lupus anticoagulant.   NOTE: Only persistent lupus anticoagulants are thought to be of clinical   significance. For this reason, repeat testing in 12 or more weeks after an   initial positive result should be considered to confirm or refute the   presence of a lupus anticoagulant,  depending on clinical presentation.   Results of lupus anticoagulant tests may be falsely positive in the   presence of certain anticoagulant therapies.      Narrative     Performed at:  [address] [place] [address]  [id number]  : Ivonne Gaines MD, Phone:  [phone]            Contains abnormal data Reflexed DRVVT Mix   Order: [id number] - Reflex for Order [id number]   Collected:  8/8/2019 09:23   View Full Report  Component  Ref Range & Units 2wk ago   Dilute Viper Venom 1:1 Mix  0.0 - 47.0 sec 68.7 Abnormally high        Narrative     Performed at:  [address] [place] [address]  [id number]  : Ivonne Gaines MD, Phone:  [phone]            Contains abnormal data Reflexed DRVVT Mix   Order: [id number] - Reflex for Order [id number]   Collected:  8/8/2019 09:23   View Full Report  Component  Ref Range & Units 2wk ago   Dilute Viper Venom 1:1 Mix  0.0 - 47.0 sec 68.7 Abnormally high        Narrative     Performed at:  [address] [place] [address]  [id number]  : Ivonne Gaines MD, Phone:  [phone]            Contains abnormal data Lupus Anticoagulant Reflex   Order: [id number]   Collected:  8/8/2019 09:23   View Full Report  Component  Ref Range & Units 2wk ago   PTT Lupus Anticoagulant  0.0 - 51.9 sec 39.4    Dilute Viper Venom Time  0.0 - 47.0 sec 95.4 Abnormally high     Lupus Anticoagulant Reflex Comment:    Comment: Results are consistent with the presence of a lupus anticoagulant.   NOTE: Only persistent lupus anticoagulants are thought to be of clinical   significance. For this reason, repeat testing in 12 or more weeks after an   initial positive result should be considered to confirm or refute the   presence of a lupus anticoagulant, depending on clinical presentation.   Results of lupus anticoagulant tests may be falsely positive in the   presence of certain anticoagulant therapies.       Narrative     Performed at:   - [facility]  [address] [place], [place]  [id number]  : Ivonne Gaines MD, Phone:  [phone]            Contains abnormal data Reflexed DRVVT Confirm   Order: [id number] - Reflex for Order [id number]   Collected:  8/8/2019 09:23   View Full Report  Component  Ref Range & Units 2wk ago   Dilute Viper Venom Time  0.8 - 1.2 ratio 1.7 Abnormally high        Narrative     Performed at:  01 - [facility]  [address] [place], [place]  [id number]  : Ivonne Gaines MD, Phone:  [phone]                      Assessment/Plan  In summary this very healthy 49-year-old white male who has had a blood clot in the gastrocnemius vein in the right lower extremity that was related to a long trip by car to [place]. The patient since initiation of Xarelto has done well and he had resolution of the symptoms 2 days after the initiation of the medicine. So far he has not had any symptoms that suggest postphlebitic syndrome, occasional discomfort at the clot site but he has not had any other problems.     The patient has had a brother who after twisting an ankle developed a significant vein thrombosis in the lower extremity that ended up with pulmonary embolism. According to the patient he has had blood testing that disclosed no abnormalities. Neither the patient's father or mother who are still alive have had blood clots. The patient has a sister who never had blood clots. He has 2 children who never have had blood clots. Therefore under the present premises I do believe that the patient will require continuation of Xarelto at least for a total of 3 months    I reviewed with the patient on 08/23/2019 his thrombophilia labs. The only thing that shows up with abnormality is a positive lupus anticoagulant. The rest of the labs including factor V Leiden mutation, prothrombin gene mutation, protein C, protein S, antithrombin-III, anti-beta glycoprotein antibodies  and anticardiolipin antibodies were negative.     I pointed out to the patient that sometimes lupus anticoagulant comes and goes and sometimes is responsible for blood clots not only in the venous circulation but also in the arterial circulation. Even there is information that new anticoagulants can favor a lupus anticoagulant to appear in laboratory testing without triggering any definitive coagulant phenomenon. Therefore I do believe that suggested by protocol a lupus anticoagulant needs to be retested again in 3 months to confirm or deny the presence of this and the correlation with the previous clot. To my eyes the most likely reason why he had a clot was the long trip by car.     RECOMMENDATIONS:  1. He will remain on Xarelto 20 mg a day for the time being until I review him back in 3 months.  2. He will be protecting himself from trauma to minimize bleeding.  3. He will come back in 10 weeks, proceed with lupus anticoagulant testing and review him back a week later.     If the lupus anticoagulant is negative very likely we will recommend stopping the Xarelto and going to an aspirin.    The patient asked the question about any food that he could have to minimize clots. I think he is a very healthy eater. I do not think he needs to modify his habits and I suggested a plant based diet with physical activity.

## 2019-11-01 ENCOUNTER — LAB (OUTPATIENT)
Dept: LAB | Facility: HOSPITAL | Age: 49
End: 2019-11-01

## 2019-11-01 DIAGNOSIS — I82.401 ACUTE DEEP VEIN THROMBOSIS (DVT) OF RIGHT LOWER EXTREMITY, UNSPECIFIED VEIN (HCC): Primary | ICD-10-CM

## 2019-11-01 LAB
BASOPHILS # BLD AUTO: 0.07 10*3/MM3 (ref 0–0.2)
BASOPHILS NFR BLD AUTO: 1.1 % (ref 0–1.5)
DEPRECATED RDW RBC AUTO: 44.3 FL (ref 37–54)
EOSINOPHIL # BLD AUTO: 0.23 10*3/MM3 (ref 0–0.4)
EOSINOPHIL NFR BLD AUTO: 3.7 % (ref 0.3–6.2)
ERYTHROCYTE [DISTWIDTH] IN BLOOD BY AUTOMATED COUNT: 13.1 % (ref 12.3–15.4)
HCT VFR BLD AUTO: 43.1 % (ref 37.5–51)
HGB BLD-MCNC: 14.5 G/DL (ref 13–17.7)
IMM GRANULOCYTES # BLD AUTO: 0.02 10*3/MM3 (ref 0–0.05)
IMM GRANULOCYTES NFR BLD AUTO: 0.3 % (ref 0–0.5)
LYMPHOCYTES # BLD AUTO: 1.67 10*3/MM3 (ref 0.7–3.1)
LYMPHOCYTES NFR BLD AUTO: 26.9 % (ref 19.6–45.3)
MCH RBC QN AUTO: 30.9 PG (ref 26.6–33)
MCHC RBC AUTO-ENTMCNC: 33.6 G/DL (ref 31.5–35.7)
MCV RBC AUTO: 91.7 FL (ref 79–97)
MONOCYTES # BLD AUTO: 0.62 10*3/MM3 (ref 0.1–0.9)
MONOCYTES NFR BLD AUTO: 10 % (ref 5–12)
NEUTROPHILS # BLD AUTO: 3.59 10*3/MM3 (ref 1.7–7)
NEUTROPHILS NFR BLD AUTO: 58 % (ref 42.7–76)
NRBC BLD AUTO-RTO: 0 /100 WBC (ref 0–0.2)
PLATELET # BLD AUTO: 294 10*3/MM3 (ref 140–450)
PMV BLD AUTO: 9 FL (ref 6–12)
RBC # BLD AUTO: 4.7 10*6/MM3 (ref 4.14–5.8)
WBC NRBC COR # BLD: 6.2 10*3/MM3 (ref 3.4–10.8)

## 2019-11-01 PROCEDURE — 36415 COLL VENOUS BLD VENIPUNCTURE: CPT

## 2019-11-01 PROCEDURE — 85025 COMPLETE CBC W/AUTO DIFF WBC: CPT

## 2019-11-15 ENCOUNTER — APPOINTMENT (OUTPATIENT)
Dept: LAB | Facility: HOSPITAL | Age: 49
End: 2019-11-15

## 2019-11-15 ENCOUNTER — OFFICE VISIT (OUTPATIENT)
Dept: ONCOLOGY | Facility: CLINIC | Age: 49
End: 2019-11-15

## 2019-11-15 VITALS
WEIGHT: 216.2 LBS | RESPIRATION RATE: 16 BRPM | DIASTOLIC BLOOD PRESSURE: 80 MMHG | SYSTOLIC BLOOD PRESSURE: 115 MMHG | TEMPERATURE: 98 F | OXYGEN SATURATION: 95 % | HEART RATE: 66 BPM | HEIGHT: 71 IN | BODY MASS INDEX: 30.27 KG/M2

## 2019-11-15 DIAGNOSIS — I82.401 ACUTE DEEP VEIN THROMBOSIS (DVT) OF RIGHT LOWER EXTREMITY, UNSPECIFIED VEIN (HCC): Primary | ICD-10-CM

## 2019-11-15 PROCEDURE — 99213 OFFICE O/P EST LOW 20 MIN: CPT | Performed by: INTERNAL MEDICINE

## 2019-11-15 PROCEDURE — G0463 HOSPITAL OUTPT CLINIC VISIT: HCPCS | Performed by: INTERNAL MEDICINE

## 2019-11-15 NOTE — PROGRESS NOTES
Subjective     REASON FOR FOLLOW UP:  RIGHT LOWER EXTREMITY GASTROCNEMIUS VEIN THROMBOSIS ASSOCIATED WITH LONG TRIP BY CAR.      History of Present Illness This patient returns today to the office for followup of his previous history of gastrocnemius vein clot undergoing anticoagulation with Xarelto and previous positive lupus anticoagulant. He came a few days ago to have a repeated lupus anticoagulant. Unfortunately [facility] is backed up in regard to the testing and they do not have a report available in this regard yet. Overall the patient continues doing fine. He has not had any clinical bleeding, no new clots. His appetite has been good, his weight is stable. His bowel function and urination remain normal. He has not had any other new pertinent issues.             Past Medical History:   Diagnosis Date   • DVT (deep venous thrombosis) (CMS/HCC)         Past Surgical History:   Procedure Laterality Date   • KNEE ACL RECONSTRUCTION  2002   • OTHER SURGICAL HISTORY      cyst removed   • TOE SURGERY  2015   • VASECTOMY          Current Outpatient Medications on File Prior to Visit   Medication Sig Dispense Refill   • rivaroxaban (XARELTO) 20 MG tablet Take 1 tablet by mouth Daily With Dinner. 30 tablet 2     No current facility-administered medications on file prior to visit.         ALLERGIES:    Allergies   Allergen Reactions   • No Known Drug Allergy         Social History     Socioeconomic History   • Marital status:      Spouse name: Shima   • Number of children: 2   • Years of education: College   • Highest education level: Not on file   Occupational History   • Occupation: IT     Employer: [employer]   Tobacco Use   • Smoking status: Never Smoker   • Smokeless tobacco: Never Used   Substance and Sexual Activity   • Alcohol use: Yes     Comment: Occasional   • Drug use: No   • Sexual activity: Defer        Family History   Problem Relation Age of Onset   •  Hypertension Father    • Pulmonary embolism Brother         Review of Systems       General: no fever, no chills, no fatigue,no weight changes, no lack of appetite.  Eyes: no epiphora, xerophthalmia,conjunctivitis, pain, glaucoma, blurred vision, blindness, secretion, photophobia, proptosis, diplopia.  Ears: no otorrhea, tinnitus, otorrhagia, deafness, pain, vertigo.  Nose: no rhinorrhea, no epistaxis, no alteration in perception of odors, no sinuses pressure.  Mouth: no alteration in gums or teeth,  No ulcers, no difficulty with mastication or deglut ion, no odynophagia.  Neck: no masses or pain, no thyroid alterations, no pain in muscles or arteries, no carotid odynia, no crepitation.  Respiratory: no cough, no sputum production,no dyspnea,no trepopnea, no pleuritic pain,no hemoptysis.  Heart: no syncope, no irregularity, no palpitations, no angina,no orthopnea,no paroxysmal nocturnal dyspnea.  Vascular Venous: no tenderness,no edema,no palpable cords,no postphlebitic syndrome, no skin changes no ulcerations.  Vascular Arterial: no distal ischemia, noclaudication, no gangrene, no neuropathic ischemic pain, no skin ulcers, no paleness no cyanosis.  GI: no dysphagia, no odynophagia, no regurgitation, no heartburn,no indigestion,no nausea,no vomiting,no hematemesis ,no melena,no jaundice,no distention, no obstipation,no enterorrhagia,no proctalgia,no anal  lesions, no changes in bowel habits.  : no frequency, no hesitancy, no hematuria, no discharge,no  pain.  Musculoskeletal: no muscle or tendon pain or inflammation,no  joint pain, no edema, no functional limitation,no fasciculations, no mass.  Neurologic: no headache, no seizures, noalterations on Craneal nerves, no motor deficit, no sensory deficit, normal coordination, no alteration in memory,normal orientation, calculation,normal writting, verbal and written language.  Skin: no rashes,no pruritus no localized lesions.  Psychiatric: no anxiety, no depression,no  "agitation, no delusions, proper insight.          Objective     Vitals:    11/15/19 1023   BP: 115/80   Pulse: 66   Resp: 16   Temp: 98 °F (36.7 °C)   TempSrc: Oral   SpO2: 95%   Weight: 98.1 kg (216 lb 3.2 oz)   Height: 180 cm (70.87\")   PainSc: 0-No pain     Current Status 11/15/2019   ECOG score 0       Physical Exam    GENERAL:  Well-developed, well-nourished  Patient  in no acute distress.   SKIN:  Warm, dry ,NO rashes,NO purpura ,NO petechiae.  HEENT:  Pupils were equal and reactive to light and accomodation, conjunctivas non injected, no pterigion, normal extraocular movements, normal visual acuity.   Mouth mucosa was moist, no exudates in oropharynx, normal gum line, normal roof of the mouth and pillars, normal papillations of the tongue.  NECK:  Supple with good range of motion; no thyromegaly or masses, no JVD or bruits, no cervical adenopathies.No carotid arteries pain, no carotid abnormal pulsation , NO arterial dance.  LYMPHATICS:  No cervical, NO supraclavicular, NO axillary,NO epitrochlear , NO inguinal adenopathy.  CHEST:  Normal excursion of both darwin thoraces, normal voice fremitus, no subcutaneous emphysema, normal axillas, no rashes or acanthosis nigricans. Lungs clear to percussion and auscultation, normal breath sounds bilaterally, no wheezing,NO crackles NO ronchi, NO stridor, NO rubs.  CARDIAC AND VASCULAR:  normal rate and regular rhythm, without murmurs,NO rubs NO S3 NO S4 right or left . Normal femoral, popliteal, pedis, brachial and carotid pulses.  ABDOMEN:  Soft, nontender with no organomegaly or masses, no ascites, no collateral circulation,no distention,no Gerardo sign, no abdominal pain, no inguinal hernias,no umbilical hernia, no abdominal bruits. Normal bowel sounds.  GENITAL: Not  Performed.  EXTREMITIES  AND SPINE:  No clubbing, cyanosis or edema, no deformities or pain .No kyphosis, scoliosis, deformities or pain in spine, ribs or pelvic bone.  NEUROLOGICAL:  Patient was awake, " alert, oriented to time, person and place.                        RECENT LABS:  Hematology WBC   Date Value Ref Range Status   11/01/2019 6.20 3.40 - 10.80 10*3/mm3 Final   06/12/2019 6.38 3.40 - 10.80 10*3/mm3 Final     RBC   Date Value Ref Range Status   11/01/2019 4.70 4.14 - 5.80 10*6/mm3 Final   06/12/2019 4.72 4.14 - 5.80 10*6/mm3 Final     Hemoglobin   Date Value Ref Range Status   11/01/2019 14.5 13.0 - 17.7 g/dL Final     Hematocrit   Date Value Ref Range Status   11/01/2019 43.1 37.5 - 51.0 % Final     Platelets   Date Value Ref Range Status   11/01/2019 294 140 - 450 10*3/mm3 Final              Assessment/Plan  In summary this very healthy 49-year-old white male who has had a blood clot in the gastrocnemius vein in the right lower extremity that was related to a long trip by car to [place]. The patient since initiation of Xarelto has done well and he had resolution of the symptoms 2 days after the initiation of the medicine. So far he has not had any symptoms that suggest postphlebitic syndrome, occasional discomfort at the clot site but he has not had any other problems.     The patient has had a brother who after twisting an ankle developed a significant vein thrombosis in the lower extremity that ended up with pulmonary embolism. According to the patient he has had blood testing that disclosed no abnormalities. Neither the patient's father or mother who are still alive have had blood clots. The patient has a sister who never had blood clots. He has 2 children who never have had blood clots. Therefore under the present premises I do believe that the patient will require continuation of Xarelto at least for a total of 3 months    I reviewed with the patient on 08/23/2019 his thrombophilia labs. The only thing that shows up with abnormality is a positive lupus anticoagulant. The rest of the labs including factor V Leiden mutation, prothrombin gene mutation, protein C, protein S, antithrombin-III,  anti-beta glycoprotein antibodies and anticardiolipin antibodies were negative.     On reviewing the patient on 11/15/2019 he has not had any new episodes of clots, he has not had any problems with the Xarelto that he takes on a regular basis 100% compliance. Again no clinical bleeding. Laboratory workup including a CBC that is normal. On the other hand his lupus anticoagulant is still pending. [facility] lab [facility] and the test will be available at some point next week.    I discussed with the patient and his wife today the fact of the delay in the test and I apologized for that. On the other hand I would like for him to remain on his Xarelto. I will talk with him on the telephone about the testing once that it becomes available about the report and decide how to proceed at that point. If his lupus anticoagulant remains positive the patient will remain on Xarelto. If the lupus anticoagulant is negative plans will be made for him to stop the Xarelto.     I discussed this with his wife in detail.

## 2019-11-19 ENCOUNTER — TELEPHONE (OUTPATIENT)
Dept: ONCOLOGY | Facility: CLINIC | Age: 49
End: 2019-11-19

## 2019-11-19 LAB
APTT HEX PL PPP: 0 SEC
APTT IMM NP PPP: ABNORMAL S
APTT PPP 1:1 SALINE: ABNORMAL S
APTT PPP: 24.8 SEC
B2 GLYCOPROT1 IGA SER-ACNC: <10 SAU
B2 GLYCOPROT1 IGG SER-ACNC: <10 SGU
B2 GLYCOPROT1 IGM SER-ACNC: <10 SMU
CARDIOLIPIN IGG SER IA-ACNC: 22 GPL
CARDIOLIPIN IGM SER IA-ACNC: <10 MPL
CONFIRM DRVVT: 35.7 SEC
INR PPP: 1 RATIO
LAC INTERPRETATION: ABNORMAL
PROTHROMBIN TIME: 10.7 SEC
SCREEN DRVVT/NORMAL: 1.3 RATIO
SCREEN DRVVT: 47.6 SEC
THROMBIN TIME: 17.5 SEC

## 2019-11-19 NOTE — TELEPHONE ENCOUNTER
I called the patient to notify him that his lupus anticoagulant test was negative. Under these premises, the patient will complete 2 more weeks of his anticoagulant and 3 days before he stops that he will initiate a baby aspirin 81 mg a day. We will bring him for reassessment in 6 months. Otherwise, no other intervention from my point of view. He agrees to proceed.

## 2019-11-19 NOTE — TELEPHONE ENCOUNTER
Lupus Anticoag / Cardiolipin Ab   Order: [id number]   Status:  Final result   Visible to patient:  No (Not Released) Next appt:  None Dx:  Acute deep vein thrombosis (DVT) of r...   Component  Ref Range & Units 2wk ago  (11/1/19) 3mo ago  (8/8/19) 3mo ago  (8/8/19)   Protime  sec 10.7      Comment: Reference Range:   18 years and older: 9.6 - 11.5   INR  ratio 1.0      Comment: Reference Range:   18 years and older: 0.9 - 1.1   aPTT  sec 24.8      Comment: This test has not been validated for monitoring   unfractionated heparin therapy.  aPTT-based therapeutic   ranges for unfractionated heparin therapy have not been   established.  Consider ordering Heparin anti-Xa   (unfractionated).   Reference Range:   18 years and older: 22.9 - 30.2   APTT 1:1 NP      Comment: Testing Not Indicated   Not indicated   APTT 1:1 Saline      Comment: Testing Not Indicated   Not indicated   Thrombin Time  sec 17.5      Comment: Reference Range:   0.0 - 23.0   DRVVT Screen Seconds  sec 47.6 Abnormally high       Comment: Reference Range:   <= 47.0   DRVVT Confirm Seconds  sec 35.7      DRVVT/Confirm Ratio  ratio 1.3 Abnormally high       Comment: Testing while the patient is on anticoagulant therapy,   including warfarin, dabigatran, or a direct Xa inhibitor may   cause a false positive result.   Reference Range:   0.8 - 1.2   Hex Phosph Neut Test  sec 0      Comment: This value is NEGATIVE.   This is a qualitative assay and is therefore reported as   positive for lupus anticoagulant or negative.  The   quantitative value is provided as an aid in diagnosis.   Reference Range:             Dictation on: 11/19/2019  6:16 PM by: DARLENE MEDINA [[id number]]

## 2019-11-20 ENCOUNTER — TELEPHONE (OUTPATIENT)
Dept: ONCOLOGY | Facility: CLINIC | Age: 49
End: 2019-11-20

## 2019-11-20 NOTE — TELEPHONE ENCOUNTER
----- Message from Terry Steele MD sent at 11/19/2019  6:16 PM EST -----   READ MY PHONE NOTE RAMONE IN 6 MO CBC

## 2020-02-03 ENCOUNTER — TELEPHONE (OUTPATIENT)
Dept: ONCOLOGY | Facility: CLINIC | Age: 50
End: 2020-02-03

## 2020-02-03 NOTE — TELEPHONE ENCOUNTER
Patient was wondering if he had to get a travel shot for his short flight to [facility].   His trip is scheduled Feb 16th.    Phone: [phone]

## 2020-02-03 NOTE — TELEPHONE ENCOUNTER
Per Dr. Steele pt is to inject 100 mg of Lovenox the morning he travels to [place] and 100 mg on the morning he returns to [place]. His wife is a nurse and will administer his injection. Per Anabel pt is ok to have wife administer his injection. Pt V/U. Script e-scribed to pt's pharmacy.

## 2020-02-25 ENCOUNTER — OFFICE VISIT (OUTPATIENT)
Dept: FAMILY MEDICINE CLINIC | Facility: CLINIC | Age: 50
End: 2020-02-25

## 2020-02-25 ENCOUNTER — HOSPITAL ENCOUNTER (OUTPATIENT)
Dept: GENERAL RADIOLOGY | Facility: HOSPITAL | Age: 50
Discharge: HOME | End: 2020-02-25
Admitting: NURSE PRACTITIONER

## 2020-02-25 VITALS
TEMPERATURE: 98.1 F | SYSTOLIC BLOOD PRESSURE: 112 MMHG | OXYGEN SATURATION: 98 % | HEART RATE: 65 BPM | WEIGHT: 221.6 LBS | BODY MASS INDEX: 31.02 KG/M2 | HEIGHT: 71 IN | DIASTOLIC BLOOD PRESSURE: 80 MMHG

## 2020-02-25 DIAGNOSIS — S93.402A SPRAIN OF LEFT ANKLE, UNSPECIFIED LIGAMENT, INITIAL ENCOUNTER: Primary | ICD-10-CM

## 2020-02-25 PROCEDURE — 73610 X-RAY EXAM OF ANKLE: CPT

## 2020-02-25 PROCEDURE — 99213 OFFICE O/P EST LOW 20 MIN: CPT | Performed by: NURSE PRACTITIONER

## 2020-02-25 RX ORDER — ASPIRIN 81 MG/1
81 TABLET, CHEWABLE ORAL DAILY
COMMUNITY

## 2020-02-25 NOTE — PATIENT INSTRUCTIONS
Ankle Sprain    An ankle sprain is a stretch or tear in a ligament in the ankle. Ligaments are tissues that connect bones to each other.  The two most common types of ankle sprains are:  · Inversion sprain. This happens when the foot turns inward and the ankle rolls outward. It affects the ligament on the outside of the foot (lateral ligament).  · Eversion sprain. This happens when the foot turns outward and the ankle rolls inward. It affects the ligament on the inner side of the foot (medial ligament).  What are the causes?  This condition is often caused by accidentally rolling or twisting the ankle.  What increases the risk?  You are more likely to develop this condition if you play sports.  What are the signs or symptoms?  Symptoms of this condition include:  · Pain in your ankle.  · Swelling.  · Bruising. This may develop right after you sprain your ankle or 1-2 days later.  · Trouble standing or walking, especially when you turn or change directions.  How is this diagnosed?  This condition is diagnosed with:  · A physical exam. During the exam, your health care provider will press on certain parts of your foot and ankle and try to move them in certain ways.  · X-ray imaging. These may be taken to see how severe the sprain is and to check for broken bones.  How is this treated?  This condition may be treated with:  · A brace or splint. This is used to keep the ankle from moving until it heals.  · An elastic bandage. This is used to support the ankle.  · Crutches.  · Pain medicine.  · Surgery. This may be needed if the sprain is severe.  · Physical therapy. This may help to improve the range of motion in the ankle.  Follow these instructions at home:  If you have a brace or a splint:  · Wear the brace or splint as told by your health care provider. Remove it only as told by your health care provider.  · Loosen the brace or splint if your toes tingle, become numb, or turn cold and blue.  · Keep the brace or  splint clean.  · If the brace or splint is not waterproof:  ? Do not let it get wet.  ? Cover it with a watertight covering when you take a bath or a shower.  If you have an elastic bandage (dressing):  · Remove it to shower or bathe.  · Try not to move your ankle much, but wiggle your toes from time to time. This helps to prevent swelling.  · Adjust the dressing to make it more comfortable if it feels too tight.  · Loosen the dressing if you have numbness or tingling in your foot, or if your foot becomes cold and blue.  Managing pain, stiffness, and swelling    · Take over-the-counter and prescription medicines only as told by your health care provider.  · For 2-3 days, keep your ankle raised (elevated) above the level of your heart as much as possible.  · If directed, put ice on the injured area:  ? If you have a removable brace or splint, remove it as told by your health care provider.  ? Put ice in a plastic bag.  ? Place a towel between your skin and the bag.  ? Leave the ice on for 20 minutes, 2-3 times a day.  General instructions  · Rest your ankle.  · Do not use the injured limb to support your body weight until your health care provider says that you can. Use crutches as told by your health care provider.  · Do not use any products that contain nicotine or tobacco, such as cigarettes, e-cigarettes, and chewing tobacco. If you need help quitting, ask your health care provider.  · Keep all follow-up visits as told by your health care provider. This is important.  Contact a health care provider if:  · You have rapidly increasing bruising or swelling.  · Your pain is not relieved with medicine.  Get help right away if:  · Your foot or toes become numb or blue.  · You have severe pain that gets worse.  Summary  · An ankle sprain is a stretch or tear in a ligament in the ankle. Ligaments are tissues that connect bones to each other.  · This condition is often caused by accidentally rolling or twisting the  ankle.  · Symptoms include pain, swelling, bruising, and trouble walking.  · To relieve pain and swelling, put ice on the affected ankle, raise your ankle above the level of your heart, and use an elastic bandage.  · Keep all follow-up visits as told by your health care provider. This is important.  This information is not intended to replace advice given to you by your health care provider. Make sure you discuss any questions you have with your health care provider.  Document Released: 12/18/2006 Document Revised: 09/10/2019 Document Reviewed: 05/14/2019  [employer] Patient Education © 2020 [employer]

## 2020-02-25 NOTE — PROGRESS NOTES
Subjective   Thomas Silvestre is a 50 y.o. male.     Chief Complaint   Patient presents with   • Ankle Injury     x week and 2 days, twisted lt ankle, has done ice and elevation but it is still swollen and painful.      HPI  Pt is here for left ankle swelling and pain following left ankle eversion injury while trail hiking appr 10 days ago in [place].  Was able to walk on it immediately after but did become swollen and painful.  Has been wearing compression stockings, used ICE and ibuprofen initially with elevation.  He is concerned today because it is still very swollen and painful with ambulation.  Pain is located mostly diffusely around lateral malleolus.  Dorsiflexion and walking up and down stair exacerbate pain.  Pain is relived with rest, elevation.  Has not used any ACE wrap and has not tried to resume exercise yet.     Has history of  Right DVT provoked by long car ride and was seen by hematology.  W/U was negative for genetic abnormality causing DVT and thought to be provoked by car ride.  Heme-onc has been having him take lovenox injection day of long travel and takes a baby asa otherwise.  He has been doing this as directed without complication.  No calf pain or swelling o/w noted in his left leg.      Social History     Tobacco Use   • Smoking status: Never Smoker   • Smokeless tobacco: Never Used   Substance Use Topics   • Alcohol use: Yes     Comment: Occasional   • Drug use: No       The following portions of the patient's history were reviewed and updated as appropriate: allergies, current medications, past family history, past medical history, past social history, past surgical history and problem list.    Review of Systems   Constitutional: Negative for chills, fatigue and fever.   Respiratory: Negative for cough, chest tightness, shortness of breath and wheezing.    Cardiovascular: Negative for chest pain, palpitations and leg swelling.   Gastrointestinal: Negative for blood in stool.  "  Genitourinary: Negative for hematuria.   Musculoskeletal: Positive for gait problem (secondary to left ankle pain). Negative for back pain.   Hematological: Does not bruise/bleed easily.       Objective   Blood pressure 112/80, pulse 65, temperature 98.1 °F (36.7 °C), temperature source Oral, height 180 cm (70.87\"), weight 101 kg (221 lb 9.6 oz), SpO2 98 %.  Body mass index is 31.02 kg/m².    Physical Exam   Constitutional: He is oriented to person, place, and time. He appears well-developed and well-nourished. No distress.   HENT:   Head: Normocephalic and atraumatic.   Eyes: Conjunctivae are normal. Right eye exhibits no discharge. Left eye exhibits no discharge.   Cardiovascular: Normal rate and regular rhythm.   Pulmonary/Chest: Effort normal and breath sounds normal.   Abdominal: Soft. Bowel sounds are normal. There is no tenderness.   Musculoskeletal: He exhibits tenderness. He exhibits no deformity.   Left ankle swelling noted predominately over lateral malleolus but also some mild swelling across anterior and posterior high ankle.  Generalized TTP lateral ankle, but no point tenderness and no obvious deformity.  FROM ankle joint and phalanges.   No bruising noted.    Normal cap refill, toes warm and dry, no wounds.     No left calf tenderness and left leg does not appear swollen   Neurological: He is alert and oriented to person, place, and time.   Skin: Skin is warm and dry. He is not diaphoretic.   Psychiatric: He has a normal mood and affect.   Nursing note and vitals reviewed.      Assessment   Problem List Items Addressed This Visit     None      Visit Diagnoses     Sprain of left ankle, unspecified ligament, initial encounter    -  Primary    Relevant Orders    XR Ankle 3+ View Left           Procedures           Impression and Plan:  Most likely high left ankle sprain with residual swelling.  Does not appear to have any swelling in left leg indicating DVT.  Pt is requesting XR and will order this " today.  I have given ACE wrap and instructions for wrapping.  He should continue ICE and elevation. Expected course and duration pending any diagnostic changes based on XR today.     He has not been taking the asa with food-taking on empty stomach in am.  We discussed need to have food to lessen risk for GI complications.      Health Maintenance Due   Topic Date Due   • COLONOSCOPY  03/15/2016   • PROSTATE CANCER SCREENING  12/07/2017   • LIPID PANEL  12/07/2017   • ANNUAL PHYSICAL  12/08/2017   • INFLUENZA VACCINE  08/01/2019              EMR Dragon/Transcription disclaimer:   Much of this encounter note is an electronic transcription/translation of spoken language to printed text. The electronic translation of spoken language may permit erroneous, or at times, nonsensical words or phrases to be inadvertently transcribed; Although I have reviewed the note for such errors, some may still exist.      Ankle Sprain    An ankle sprain is a stretch or tear in a ligament in the ankle. Ligaments are tissues that connect bones to each other.  The two most common types of ankle sprains are:  · Inversion sprain. This happens when the foot turns inward and the ankle rolls outward. It affects the ligament on the outside of the foot (lateral ligament).  · Eversion sprain. This happens when the foot turns outward and the ankle rolls inward. It affects the ligament on the inner side of the foot (medial ligament).  What are the causes?  This condition is often caused by accidentally rolling or twisting the ankle.  What increases the risk?  You are more likely to develop this condition if you play sports.  What are the signs or symptoms?  Symptoms of this condition include:  · Pain in your ankle.  · Swelling.  · Bruising. This may develop right after you sprain your ankle or 1-2 days later.  · Trouble standing or walking, especially when you turn or change directions.  How is this diagnosed?  This condition is diagnosed with:  · A  physical exam. During the exam, your health care provider will press on certain parts of your foot and ankle and try to move them in certain ways.  · X-ray imaging. These may be taken to see how severe the sprain is and to check for broken bones.  How is this treated?  This condition may be treated with:  · A brace or splint. This is used to keep the ankle from moving until it heals.  · An elastic bandage. This is used to support the ankle.  · Crutches.  · Pain medicine.  · Surgery. This may be needed if the sprain is severe.  · Physical therapy. This may help to improve the range of motion in the ankle.  Follow these instructions at home:  If you have a brace or a splint:  · Wear the brace or splint as told by your health care provider. Remove it only as told by your health care provider.  · Loosen the brace or splint if your toes tingle, become numb, or turn cold and blue.  · Keep the brace or splint clean.  · If the brace or splint is not waterproof:  ? Do not let it get wet.  ? Cover it with a watertight covering when you take a bath or a shower.  If you have an elastic bandage (dressing):  · Remove it to shower or bathe.  · Try not to move your ankle much, but wiggle your toes from time to time. This helps to prevent swelling.  · Adjust the dressing to make it more comfortable if it feels too tight.  · Loosen the dressing if you have numbness or tingling in your foot, or if your foot becomes cold and blue.  Managing pain, stiffness, and swelling    · Take over-the-counter and prescription medicines only as told by your health care provider.  · For 2-3 days, keep your ankle raised (elevated) above the level of your heart as much as possible.  · If directed, put ice on the injured area:  ? If you have a removable brace or splint, remove it as told by your health care provider.  ? Put ice in a plastic bag.  ? Place a towel between your skin and the bag.  ? Leave the ice on for 20 minutes, 2-3 times a day.  General  instructions  · Rest your ankle.  · Do not use the injured limb to support your body weight until your health care provider says that you can. Use crutches as told by your health care provider.  · Do not use any products that contain nicotine or tobacco, such as cigarettes, e-cigarettes, and chewing tobacco. If you need help quitting, ask your health care provider.  · Keep all follow-up visits as told by your health care provider. This is important.  Contact a health care provider if:  · You have rapidly increasing bruising or swelling.  · Your pain is not relieved with medicine.  Get help right away if:  · Your foot or toes become numb or blue.  · You have severe pain that gets worse.  Summary  · An ankle sprain is a stretch or tear in a ligament in the ankle. Ligaments are tissues that connect bones to each other.  · This condition is often caused by accidentally rolling or twisting the ankle.  · Symptoms include pain, swelling, bruising, and trouble walking.  · To relieve pain and swelling, put ice on the affected ankle, raise your ankle above the level of your heart, and use an elastic bandage.  · Keep all follow-up visits as told by your health care provider. This is important.  This information is not intended to replace advice given to you by your health care provider. Make sure you discuss any questions you have with your health care provider.  Document Released: 12/18/2006 Document Revised: 09/10/2019 Document Reviewed: 05/14/2019  [employer] Patient Education © 2020 [employer]

## 2020-02-26 DIAGNOSIS — R93.7 ABNORMAL X-RAY OF BONE: ICD-10-CM

## 2020-02-26 DIAGNOSIS — S93.402A SPRAIN OF LEFT ANKLE, UNSPECIFIED LIGAMENT, INITIAL ENCOUNTER: Primary | ICD-10-CM

## 2020-05-06 ENCOUNTER — APPOINTMENT (OUTPATIENT)
Dept: LAB | Facility: HOSPITAL | Age: 50
End: 2020-05-06

## 2020-05-06 ENCOUNTER — TELEMEDICINE (OUTPATIENT)
Dept: ONCOLOGY | Facility: CLINIC | Age: 50
End: 2020-05-06

## 2020-05-06 DIAGNOSIS — I82.401 ACUTE DEEP VEIN THROMBOSIS (DVT) OF RIGHT LOWER EXTREMITY, UNSPECIFIED VEIN (HCC): Primary | ICD-10-CM

## 2020-05-06 DIAGNOSIS — I80.201 DEEP VEIN THROMBOPHLEBITIS OF RIGHT LEG (HCC): ICD-10-CM

## 2020-05-06 PROCEDURE — 99213 OFFICE O/P EST LOW 20 MIN: CPT | Performed by: INTERNAL MEDICINE

## 2020-05-06 NOTE — PROGRESS NOTES
Subjective     REASON FOR FOLLOW UP:  RIGHT LOWER EXTREMITY GASTROCNEMIUS VEIN THROMBOSIS ASSOCIATED WITH LONG TRIP BY CAR.positive lupus anticoagulant in one occasion, now off anticoagulants      History of Present Illness   I HAVE CALLED THIS PATIENT ON THE PHONE TODAY AND VERBALLY HAS CONSENTED ABOUT TELEMEDICINE VISIT     This patient has not been able to connect to Zoom even though he was able to see me on his camera. In any event, the patient has had no issues pertinent to his previous history of vein thrombosis in the lower extremity. He had a trip and he received Lovenox in the day out and in the day back in with no difficulties with bleeding and no new thrombosis. His overall health remains excellent. He will proceed eventually, depending on the situation with the pandemia, with a colonoscopy and his prostate test. He has no other new medical issues at this time. His weight is stable, his appetite is good. His bowel function and urination are normal. He has no cardiovascular or respiratory issues of any nature. He has no fever, chills, adenopathy, rashes or joint pain.     He has not developed any postphlebitic syndrome.     He remains on his aspirin.           Past Medical History:   Diagnosis Date   • DVT (deep venous thrombosis) (CMS/HCC)         Past Surgical History:   Procedure Laterality Date   • KNEE ACL RECONSTRUCTION  2002   • OTHER SURGICAL HISTORY      cyst removed   • TOE SURGERY  2015   • VASECTOMY          Current Outpatient Medications on File Prior to Visit   Medication Sig Dispense Refill   • aspirin 81 MG chewable tablet Chew 81 mg Daily.     • enoxaparin (LOVENOX) 100 MG/ML solution syringe Inject  under the skin into the appropriate area as directed As Needed.       No current facility-administered medications on file prior to visit.         ALLERGIES:    Allergies   Allergen Reactions   • No Known Drug Allergy         Social History     Socioeconomic History   • Marital status:       Spouse name: Shima   • Number of children: 2   • Years of education: College   • Highest education level: Not on file   Occupational History   • Occupation: IT     Employer: [employer]   Tobacco Use   • Smoking status: Never Smoker   • Smokeless tobacco: Never Used   Substance and Sexual Activity   • Alcohol use: Yes     Comment: Occasional   • Drug use: No   • Sexual activity: Defer        Family History   Problem Relation Age of Onset   • Hypertension Father    • Pulmonary embolism Brother         Review of Systems           General: no fever, no chills, no fatigue,no weight changes, no lack of appetite.  Eyes: no epiphora, xerophthalmia,conjunctivitis, pain, glaucoma, blurred vision, blindness, secretion, photophobia, proptosis, diplopia.  Ears: no otorrhea, tinnitus, otorrhagia, deafness, pain, vertigo.  Nose: no rhinorrhea, no epistaxis, no alteration in perception of odors, no sinuses pressure.  Mouth: no alteration in gums or teeth,  No ulcers, no difficulty with mastication or deglut ion, no odynophagia.  Neck: no masses or pain, no thyroid alterations, no pain in muscles or arteries, no carotid odynia, no crepitation.  Respiratory: no cough, no sputum production,no dyspnea,no trepopnea, no pleuritic pain,no hemoptysis.  Heart: no syncope, no irregularity, no palpitations, no angina,no orthopnea,no paroxysmal nocturnal dyspnea.  Vascular Venous: no tenderness,no edema,no palpable cords,no postphlebitic syndrome, no skin changes no ulcerations.  Vascular Arterial: no distal ischemia, noclaudication, no gangrene, no neuropathic ischemic pain, no skin ulcers, no paleness no cyanosis.  GI: no dysphagia, no odynophagia, no regurgitation, no heartburn,no indigestion,no nausea,no vomiting,no hematemesis ,no melena,no jaundice,no distention, no obstipation,no enterorrhagia,no proctalgia,no anal  lesions, no changes in bowel habits.  : no frequency, no hesitancy, no hematuria, no  discharge,no  pain.  Musculoskeletal: no muscle or tendon pain or inflammation,no  joint pain, no edema, no functional limitation,no fasciculations, no mass.  Neurologic: no headache, no seizures, noalterations on Craneal nerves, no motor deficit, no sensory deficit, normal coordination, no alteration in memory,normal orientation, calculation,normal writting, verbal and written language.  Skin: no rashes,no pruritus no localized lesions.  Psychiatric: no anxiety, no depression,no agitation, no delusions, proper insight.        Objective     There were no vitals filed for this visit.  Current Status 11/15/2019   ECOG score 0       Physical Exam  He sounds normal on examination and strong. He is doing physical activity. He has normal activities of daily living, he is working from home, and he is planning traveling to go fishing in [place] and [place] in the summer.                            RECENT LABS:  Hematology WBC   Date Value Ref Range Status   11/01/2019 6.20 3.40 - 10.80 10*3/mm3 Final   06/12/2019 6.38 3.40 - 10.80 10*3/mm3 Final     RBC   Date Value Ref Range Status   11/01/2019 4.70 4.14 - 5.80 10*6/mm3 Final   06/12/2019 4.72 4.14 - 5.80 10*6/mm3 Final     Hemoglobin   Date Value Ref Range Status   11/01/2019 14.5 13.0 - 17.7 g/dL Final     Hematocrit   Date Value Ref Range Status   11/01/2019 43.1 37.5 - 51.0 % Final     Platelets   Date Value Ref Range Status   11/01/2019 294 140 - 450 10*3/mm3 Final              Assessment/Plan  In summary this very healthy 49-year-old white male who has had a blood clot in the gastrocnemius vein in the right lower extremity that was related to a long trip by car to [place]. The patient since initiation of Xarelto has done well and he had resolution of the symptoms 2 days after the initiation of the medicine. So far he has not had any symptoms that suggest postphlebitic syndrome, occasional discomfort at the clot site but he has not had any other problems.     The  patient has had a brother who after twisting an ankle developed a significant vein thrombosis in the lower extremity that ended up with pulmonary embolism. According to the patient he has had blood testing that disclosed no abnormalities. Neither the patient's father or mother who are still alive have had blood clots. The patient has a sister who never had blood clots. He has 2 children who never have had blood clots. Therefore under the present premises I do believe that the patient will require continuation of Xarelto at least for a total of 3 months    I reviewed with the patient on 08/23/2019 his thrombophilia labs. The only thing that shows up with abnormality is a positive lupus anticoagulant. The rest of the labs including factor V Leiden mutation, prothrombin gene mutation, protein C, protein S, antithrombin-III, anti-beta glycoprotein antibodies and anticardiolipin antibodies were negative.     The patient had further review by telemedicine on 05/06/2020. His previous lupus anticoagulant testing was negative and the patient was advised to discontinue his anticoagulation with Xarelto and move to an aspirin. He has been doing Lovenox if he travels long distances with no limitations. He has a travel time going to [place] for fishing in the summer and he does not want to miss this. In preparation for this, I will send new prescription for his Lovenox to his pharmacy. I will give him 10 injections and actually it would be a good idea to carry 1 extra with him in case he develops any problems while traveling.    I asked him to proceed with testing in regard to his colonoscopy at some point whenever this can be performed, maybe by the end of the year or maybe next year given the circumstances of the pandemic. Prostate exam is a must now that he is 51 and I advised him to discuss this with his primary physician. He is welcome for me to do it if he wants to. Otherwise, we will make him an appointment to come back  in a year for review.     I DISCUSSED WITH PATIENT IN DETAIL FORMS TO DECREASE CHANCES OF CORONAVIRUS INFECTION INCLUDING ISOLATION, PROPER HAND HIGIENE, AVOID PUBLIC PLACES  WITH CROWDS, FOLLOW  CDC RECOMENDATIONS, AND KEEP PERSONAL AND SOCIAL RESPONSIBILITY, WARE A MASK IN PUBLIC PLACES.  PATIENT IS AWARE THIS INFECTION COULD HAVE SEVERE CONSEQUENCES TO PERSONAL HEALTH AND FAMILY RAMIFICATIONS OF THIS.

## 2020-06-05 ENCOUNTER — TELEPHONE (OUTPATIENT)
Dept: ONCOLOGY | Facility: HOSPITAL | Age: 50
End: 2020-06-05

## 2020-06-05 NOTE — TELEPHONE ENCOUNTER
Pt had questions about lovenox injections and when do administer and bruising associated with it. Advised him that bruising was normal at the injection site. He v/u.

## 2020-11-09 ENCOUNTER — OFFICE VISIT (OUTPATIENT)
Dept: FAMILY MEDICINE CLINIC | Facility: CLINIC | Age: 50
End: 2020-11-09

## 2020-11-09 VITALS
SYSTOLIC BLOOD PRESSURE: 108 MMHG | OXYGEN SATURATION: 98 % | HEIGHT: 71 IN | WEIGHT: 208 LBS | BODY MASS INDEX: 29.12 KG/M2 | TEMPERATURE: 97.8 F | HEART RATE: 78 BPM | DIASTOLIC BLOOD PRESSURE: 78 MMHG

## 2020-11-09 DIAGNOSIS — Z12.11 COLON CANCER SCREENING: ICD-10-CM

## 2020-11-09 DIAGNOSIS — Z11.59 ENCOUNTER FOR HEPATITIS C SCREENING TEST FOR LOW RISK PATIENT: ICD-10-CM

## 2020-11-09 DIAGNOSIS — Z13.6 ENCOUNTER FOR SPECIAL SCREENING EXAMINATION FOR CARDIOVASCULAR DISORDER: ICD-10-CM

## 2020-11-09 DIAGNOSIS — Z00.00 ANNUAL PHYSICAL EXAM: Primary | ICD-10-CM

## 2020-11-09 DIAGNOSIS — Z12.5 PROSTATE CANCER SCREENING: ICD-10-CM

## 2020-11-09 PROBLEM — I82.409 DVT (DEEP VENOUS THROMBOSIS): Status: RESOLVED | Noted: 2019-06-13 | Resolved: 2020-11-09

## 2020-11-09 PROCEDURE — 99396 PREV VISIT EST AGE 40-64: CPT | Performed by: INTERNAL MEDICINE

## 2020-11-09 NOTE — PROGRESS NOTES
Chief Complaint   Patient presents with   • Colonoscopy     referral   • Annual Exam       HPI:  Thomas Silvestre, -1970, is a 50 y.o. male who presents for an annual physical.    Recent Hospitalizations:  No hospitalization(s) within the last year..    Current Medical Providers:  Patient Care Team:  Vitor Moya MD as PCP - General (Internal Medicine)  Terry Steele MD as Consulting Physician (Hematology and Oncology)  Brad Watkins MD (Dermatopathology)    Compared to one year ago, the patient feels his physical health is the same and his mental health is the same.    Depression Screen:  PHQ-2/PHQ-9 Depression Screening 2019   Little interest or pleasure in doing things 0   Feeling down, depressed, or hopeless 0   Total Score 0       Past Medical/Family/Social History:  The following portions of the patient's history were reviewed and updated as appropriate: allergies, current medications, past family history, past medical history, past social history, past surgical history and problem list.    No Known Allergies      Current Outpatient Medications:   •  aspirin 81 MG chewable tablet, Chew 81 mg Daily., Disp: , Rfl:   •  enoxaparin (LOVENOX) 100 MG/ML solution syringe, Use for long trips by car or airplane day of departure of each place, Disp: 10 syringe, Rfl: 2    Current medication list contains no high risk medications.  No harmful drug interactions have been identified.     Family History   Problem Relation Age of Onset   • Hypertension Father    • Pulmonary embolism Brother        Social History     Tobacco Use   • Smoking status: Never Smoker   • Smokeless tobacco: Never Used   Substance Use Topics   • Alcohol use: Yes     Comment: Occasional       Past Surgical History:   Procedure Laterality Date   • KNEE ACL RECONSTRUCTION     • OTHER SURGICAL HISTORY      cyst removed   • TOE SURGERY     • VASECTOMY         Patient Active Problem List   Diagnosis   • Health care maintenance  "  • Chronic fatigue   • Seborrheic dermatitis   • Seborrheic keratosis   • Annual physical exam   • Prostate cancer screening   • Colon cancer screening   • Encounter for special screening examination for cardiovascular disorder   • Encounter for hepatitis C screening test for low risk patient       Review of Systems   Constitutional: Negative for activity change, appetite change, fatigue, fever, unexpected weight gain and unexpected weight loss.   HENT: Negative for nosebleeds, rhinorrhea, trouble swallowing and voice change.    Eyes: Negative for visual disturbance.   Respiratory: Negative for cough, chest tightness, shortness of breath and wheezing.    Cardiovascular: Negative for chest pain, palpitations and leg swelling.   Gastrointestinal: Negative for abdominal pain, blood in stool, constipation, diarrhea, nausea, vomiting, GERD and indigestion.   Genitourinary: Negative for dysuria, frequency and hematuria.   Musculoskeletal: Negative for arthralgias, back pain and myalgias.   Skin: Negative for rash and bruise.   Neurological: Negative for dizziness, tremors, weakness, light-headedness, numbness, headache and memory problem.   Hematological: Negative for adenopathy. Does not bruise/bleed easily.   Psychiatric/Behavioral: Negative for sleep disturbance and depressed mood. The patient is not nervous/anxious.        Objective     Vitals:    11/09/20 0943   BP: 108/78   BP Location: Left arm   Patient Position: Sitting   Cuff Size: Adult   Pulse: 78   Temp: 97.8 °F (36.6 °C)   TempSrc: Temporal   SpO2: 98%   Weight: 94.3 kg (208 lb)   Height: 180 cm (70.87\")       Patient's Body mass index is 29.12 kg/m². BMI is above normal parameters. Recommendations include: exercise counseling and nutrition counseling.      No exam data present    Physical Exam  Vitals signs and nursing note reviewed.   Constitutional:       General: He is not in acute distress.     Appearance: He is well-developed. He is not diaphoretic. "   HENT:      Head: Normocephalic and atraumatic.      Right Ear: External ear normal.      Left Ear: External ear normal.      Nose: Nose normal.   Eyes:      Conjunctiva/sclera: Conjunctivae normal.      Pupils: Pupils are equal, round, and reactive to light.   Neck:      Musculoskeletal: Normal range of motion and neck supple.      Thyroid: No thyromegaly.      Trachea: No tracheal deviation.   Cardiovascular:      Rate and Rhythm: Normal rate and regular rhythm.      Heart sounds: Normal heart sounds. No murmur. No friction rub. No gallop.    Pulmonary:      Effort: Pulmonary effort is normal. No respiratory distress.      Breath sounds: Normal breath sounds.   Abdominal:      General: Bowel sounds are normal.      Palpations: Abdomen is soft. There is no mass.      Tenderness: There is no abdominal tenderness. There is no guarding.   Musculoskeletal: Normal range of motion.   Lymphadenopathy:      Cervical: No cervical adenopathy.   Skin:     General: Skin is warm and dry.      Capillary Refill: Capillary refill takes less than 2 seconds.      Findings: No rash.   Neurological:      Mental Status: He is alert and oriented to person, place, and time.      Motor: No abnormal muscle tone.      Deep Tendon Reflexes: Reflexes normal.   Psychiatric:         Behavior: Behavior normal.         Thought Content: Thought content normal.         Judgment: Judgment normal.         Recent Lab Results:  Lab Results   Component Value Date    GLU 88 06/12/2019     Lab Results   Component Value Date    TRIG 66 12/07/2016    HDL 62 (H) 12/07/2016    VLDL 13.2 12/07/2016    LDLHDL 1.7 08/10/2015       Assessment/Plan   Age-appropriate Screening Schedule:  Refer to the list below for future screening recommendations based on patient's age, sex and/or medical conditions.      Health Maintenance   Topic Date Due   • COLONOSCOPY  1970   • PROSTATE CANCER SCREENING  12/07/2017   • LIPID PANEL  12/07/2017   • TDAP/TD VACCINES (2 -  Td) 12/07/2026   • INFLUENZA VACCINE  Completed   • ZOSTER VACCINE  Discontinued       Diagnoses and all orders for this visit:    1. Annual physical exam (Primary)  -     PSA Screen  -     Comprehensive Metabolic Panel  -     Lipid Panel  -     Hepatitis C Antibody    2. Prostate cancer screening  -     PSA Screen    3. Colon cancer screening  -     Cologuard - Stool, Per Rectum; Future    4. Encounter for special screening examination for cardiovascular disorder  -     Comprehensive Metabolic Panel  -     Lipid Panel    5. Encounter for hepatitis C screening test for low risk patient  -     Hepatitis C Antibody        Annual wellness visit reviewed with patient.  All past history, medications, social history, and problem list were reviewed.  Discussed advanced directives and living will.  Patient has living will: Living will: no and information packet given to patient to complete at home and bring copy to office.  Will check the labs as ordered above to evaluate the blood sugars, kidney, liver, cholesterol for screening.  Discussed flu shot recommended to get the influenza vaccine annually in the fall.  Shingrix and pneumonia vaccination series discussed.  Encouraged follow-up with the eye doctor on annual basis.  Discussed weight and encouraged exercise as tolerated while following a healthy diet.  Reviewed sexual health and safe sex practices.  Colon cancer screening discussed and current status is needed.  Will schedule cologuard testing initially.  Discussed prostate screening.  Follow up with current specialists as needed.     An After Visit Summary with all of these plans were given to the patient.        Follow Up:  No follow-ups on file.

## 2020-11-10 LAB
ALBUMIN SERPL-MCNC: 4.4 G/DL (ref 3.5–5.2)
ALBUMIN/GLOB SERPL: 2.2 G/DL
ALP SERPL-CCNC: 76 U/L (ref 39–117)
ALT SERPL-CCNC: 29 U/L (ref 1–41)
AST SERPL-CCNC: 17 U/L (ref 1–40)
BILIRUB SERPL-MCNC: 0.5 MG/DL (ref 0–1.2)
BUN SERPL-MCNC: 13 MG/DL (ref 6–20)
BUN/CREAT SERPL: 13.7 (ref 7–25)
CALCIUM SERPL-MCNC: 9.1 MG/DL (ref 8.6–10.5)
CHLORIDE SERPL-SCNC: 100 MMOL/L (ref 98–107)
CHOLEST SERPL-MCNC: 205 MG/DL (ref 0–200)
CO2 SERPL-SCNC: 27.5 MMOL/L (ref 22–29)
CREAT SERPL-MCNC: 0.95 MG/DL (ref 0.76–1.27)
GLOBULIN SER CALC-MCNC: 2 GM/DL
GLUCOSE SERPL-MCNC: 87 MG/DL (ref 65–99)
HCV AB S/CO SERPL IA: <0.1 S/CO RATIO (ref 0–0.9)
HDLC SERPL-MCNC: 69 MG/DL (ref 40–60)
LDLC SERPL CALC-MCNC: 121 MG/DL (ref 0–100)
POTASSIUM SERPL-SCNC: 4.4 MMOL/L (ref 3.5–5.2)
PROT SERPL-MCNC: 6.4 G/DL (ref 6–8.5)
PSA SERPL-MCNC: 2.33 NG/ML (ref 0–4)
SODIUM SERPL-SCNC: 136 MMOL/L (ref 136–145)
TRIGL SERPL-MCNC: 84 MG/DL (ref 0–150)
VLDLC SERPL CALC-MCNC: 15 MG/DL (ref 5–40)

## 2020-11-14 ENCOUNTER — RESULTS ENCOUNTER (OUTPATIENT)
Dept: FAMILY MEDICINE CLINIC | Facility: CLINIC | Age: 50
End: 2020-11-14

## 2020-11-14 DIAGNOSIS — Z12.11 COLON CANCER SCREENING: ICD-10-CM

## 2020-12-09 ENCOUNTER — TELEPHONE (OUTPATIENT)
Dept: FAMILY MEDICINE CLINIC | Facility: CLINIC | Age: 50
End: 2020-12-09

## 2020-12-09 DIAGNOSIS — Z12.11 COLON CANCER SCREENING: Primary | ICD-10-CM

## 2020-12-09 DIAGNOSIS — R19.5 POSITIVE COLORECTAL CANCER SCREENING USING COLOGUARD TEST: ICD-10-CM

## 2020-12-09 NOTE — TELEPHONE ENCOUNTER
Hub staff attempted to follow warm transfer process and was unsuccessful     Caller: GABBY    Relationship to patient: Self    Best call back number:[phone]    Patient is needing: [employer] CALLED WITH A POSITIVE RESULT WOULD LIKE TO KNOW IF DR. VILLALBA RECEIVED RESULTS.     PLEASE CONTACT IF DR CALVERT DID NOT RECEIVE        GABBY (Self) [phone]     [employer]  PROMPT: PROVIDER SUPPORT

## 2021-01-20 NOTE — PROGRESS NOTES
"No chief complaint on file.  Positive Cologuard          History of Present Illness  51-year old male presents today for evaluation for positive ColoGuard test. He has never had a colonoscopy. He has a history of hemorrhoids and notices occasional bright red blood on the toilet tissue. Denies any family history of colon cancer. Denies any change in bowel habits.          Result Review :      COLOGUARD - SCAN - COLOGUARD (12/08/2020)  Comprehensive Metabolic Panel (11/09/2020 10:31)    Progress Notes by Vitor Moya MD (11/09/2020 09:45)    Vital Signs:   /84   Pulse 76   Temp 98.4 °F (36.9 °C) (Temporal)   Resp 16   Ht 180.3 cm (71\")   Wt 97.8 kg (215 lb 8 oz)   SpO2 99%   BMI 30.06 kg/m²     Body mass index is 30.06 kg/m².     Physical Exam  Vitals signs reviewed.   Constitutional:       Appearance: Normal appearance.   Abdominal:      General: Bowel sounds are normal. There is no distension.      Palpations: Abdomen is soft. Abdomen is not rigid. There is no mass or pulsatile mass.      Tenderness: There is no abdominal tenderness. There is no guarding or rebound.             Assessment and Plan      Diagnoses and all orders for this visit:    1. Positive colorectal cancer screening using Cologuard test (Primary)    2. Hemorrhoids, unspecified hemorrhoid type         Brief Summary  We will proceed with a colonoscopy due to positive Cologuard.       Documentation by Jeantete VARELA acting as a scribe in the following sections (HPI, Assessment, Plan) for the undersigned provider.       Follow Up   No follow-ups on file.     Patient Instructions   1. For positive ColoGuard testing and for colon cancer screening we will schedule a colonoscopy.         "

## 2021-01-21 ENCOUNTER — PREP FOR SURGERY (OUTPATIENT)
Dept: OTHER | Facility: HOSPITAL | Age: 51
End: 2021-01-21

## 2021-01-21 ENCOUNTER — OFFICE VISIT (OUTPATIENT)
Dept: GASTROENTEROLOGY | Facility: CLINIC | Age: 51
End: 2021-01-21

## 2021-01-21 VITALS
SYSTOLIC BLOOD PRESSURE: 122 MMHG | WEIGHT: 215.5 LBS | HEIGHT: 71 IN | BODY MASS INDEX: 30.17 KG/M2 | HEART RATE: 76 BPM | RESPIRATION RATE: 16 BRPM | TEMPERATURE: 98.4 F | OXYGEN SATURATION: 99 % | DIASTOLIC BLOOD PRESSURE: 84 MMHG

## 2021-01-21 DIAGNOSIS — R19.5 POSITIVE COLORECTAL CANCER SCREENING USING COLOGUARD TEST: ICD-10-CM

## 2021-01-21 DIAGNOSIS — K64.9 HEMORRHOIDS, UNSPECIFIED HEMORRHOID TYPE: Chronic | ICD-10-CM

## 2021-01-21 DIAGNOSIS — Z12.11 COLON CANCER SCREENING: Primary | ICD-10-CM

## 2021-01-21 DIAGNOSIS — R19.5 POSITIVE COLORECTAL CANCER SCREENING USING COLOGUARD TEST: Primary | ICD-10-CM

## 2021-01-21 PROCEDURE — 99203 OFFICE O/P NEW LOW 30 MIN: CPT | Performed by: INTERNAL MEDICINE

## 2021-01-22 ENCOUNTER — LAB REQUISITION (OUTPATIENT)
Dept: LAB | Facility: HOSPITAL | Age: 51
End: 2021-01-22

## 2021-01-22 DIAGNOSIS — Z00.00 ENCOUNTER FOR GENERAL ADULT MEDICAL EXAMINATION WITHOUT ABNORMAL FINDINGS: ICD-10-CM

## 2021-01-23 PROCEDURE — U0004 COV-19 TEST NON-CDC HGH THRU: HCPCS | Performed by: INTERNAL MEDICINE

## 2021-01-25 LAB — SARS-COV-2 RNA RESP QL NAA+PROBE: NOT DETECTED

## 2021-01-26 ENCOUNTER — LAB REQUISITION (OUTPATIENT)
Dept: LAB | Facility: HOSPITAL | Age: 51
End: 2021-01-26

## 2021-01-26 ENCOUNTER — OUTSIDE FACILITY SERVICE (OUTPATIENT)
Dept: GASTROENTEROLOGY | Facility: CLINIC | Age: 51
End: 2021-01-26

## 2021-01-26 DIAGNOSIS — Z12.11 ENCOUNTER FOR SCREENING FOR MALIGNANT NEOPLASM OF COLON: ICD-10-CM

## 2021-01-26 PROCEDURE — 45380 COLONOSCOPY AND BIOPSY: CPT | Performed by: INTERNAL MEDICINE

## 2021-01-26 PROCEDURE — 88305 TISSUE EXAM BY PATHOLOGIST: CPT | Performed by: INTERNAL MEDICINE

## 2021-01-26 PROCEDURE — 45385 COLONOSCOPY W/LESION REMOVAL: CPT | Performed by: INTERNAL MEDICINE

## 2021-01-27 LAB
LAB AP CASE REPORT: NORMAL
LAB AP CLINICAL INFORMATION: NORMAL
PATH REPORT.FINAL DX SPEC: NORMAL
PATH REPORT.GROSS SPEC: NORMAL

## 2021-03-30 ENCOUNTER — BULK ORDERING (OUTPATIENT)
Dept: CASE MANAGEMENT | Facility: OTHER | Age: 51
End: 2021-03-30

## 2021-03-30 DIAGNOSIS — Z23 IMMUNIZATION DUE: ICD-10-CM

## 2021-04-29 ENCOUNTER — LAB (OUTPATIENT)
Dept: LAB | Facility: HOSPITAL | Age: 51
End: 2021-04-29

## 2021-04-29 ENCOUNTER — OFFICE VISIT (OUTPATIENT)
Dept: ONCOLOGY | Facility: CLINIC | Age: 51
End: 2021-04-29

## 2021-04-29 VITALS
HEART RATE: 61 BPM | RESPIRATION RATE: 20 BRPM | TEMPERATURE: 97.6 F | OXYGEN SATURATION: 97 % | HEIGHT: 71 IN | DIASTOLIC BLOOD PRESSURE: 76 MMHG | SYSTOLIC BLOOD PRESSURE: 116 MMHG | WEIGHT: 217.3 LBS | BODY MASS INDEX: 30.42 KG/M2

## 2021-04-29 DIAGNOSIS — R19.5 POSITIVE COLORECTAL CANCER SCREENING USING COLOGUARD TEST: ICD-10-CM

## 2021-04-29 DIAGNOSIS — I82.441 ACUTE DEEP VEIN THROMBOSIS (DVT) OF TIBIAL VEIN OF RIGHT LOWER EXTREMITY (HCC): Primary | ICD-10-CM

## 2021-04-29 DIAGNOSIS — I82.401 ACUTE DEEP VEIN THROMBOSIS (DVT) OF RIGHT LOWER EXTREMITY, UNSPECIFIED VEIN (HCC): Primary | ICD-10-CM

## 2021-04-29 DIAGNOSIS — D12.2 ADENOMATOUS POLYP OF ASCENDING COLON: ICD-10-CM

## 2021-04-29 PROBLEM — I82.449 ACUTE DEEP VEIN THROMBOSIS (DVT) OF TIBIAL VEIN (HCC): Status: ACTIVE | Noted: 2021-04-29

## 2021-04-29 LAB
BASOPHILS # BLD AUTO: 0.08 10*3/MM3 (ref 0–0.2)
BASOPHILS NFR BLD AUTO: 1.2 % (ref 0–1.5)
DEPRECATED RDW RBC AUTO: 42.5 FL (ref 37–54)
EOSINOPHIL # BLD AUTO: 0.21 10*3/MM3 (ref 0–0.4)
EOSINOPHIL NFR BLD AUTO: 3.1 % (ref 0.3–6.2)
ERYTHROCYTE [DISTWIDTH] IN BLOOD BY AUTOMATED COUNT: 13 % (ref 12.3–15.4)
HCT VFR BLD AUTO: 41.4 % (ref 37.5–51)
HGB BLD-MCNC: 14.6 G/DL (ref 13–17.7)
IMM GRANULOCYTES # BLD AUTO: 0.03 10*3/MM3 (ref 0–0.05)
IMM GRANULOCYTES NFR BLD AUTO: 0.4 % (ref 0–0.5)
LYMPHOCYTES # BLD AUTO: 1.53 10*3/MM3 (ref 0.7–3.1)
LYMPHOCYTES NFR BLD AUTO: 22.5 % (ref 19.6–45.3)
MCH RBC QN AUTO: 31.3 PG (ref 26.6–33)
MCHC RBC AUTO-ENTMCNC: 35.3 G/DL (ref 31.5–35.7)
MCV RBC AUTO: 88.8 FL (ref 79–97)
MONOCYTES # BLD AUTO: 0.77 10*3/MM3 (ref 0.1–0.9)
MONOCYTES NFR BLD AUTO: 11.3 % (ref 5–12)
NEUTROPHILS NFR BLD AUTO: 4.19 10*3/MM3 (ref 1.7–7)
NEUTROPHILS NFR BLD AUTO: 61.5 % (ref 42.7–76)
NRBC BLD AUTO-RTO: 0 /100 WBC (ref 0–0.2)
PLATELET # BLD AUTO: 315 10*3/MM3 (ref 140–450)
PMV BLD AUTO: 9.1 FL (ref 6–12)
RBC # BLD AUTO: 4.66 10*6/MM3 (ref 4.14–5.8)
WBC # BLD AUTO: 6.81 10*3/MM3 (ref 3.4–10.8)

## 2021-04-29 PROCEDURE — 85025 COMPLETE CBC W/AUTO DIFF WBC: CPT

## 2021-04-29 PROCEDURE — 36415 COLL VENOUS BLD VENIPUNCTURE: CPT

## 2021-04-29 PROCEDURE — 99214 OFFICE O/P EST MOD 30 MIN: CPT | Performed by: INTERNAL MEDICINE

## 2021-04-29 NOTE — PROGRESS NOTES
Subjective     REASON FOR FOLLOW UP:  RIGHT LOWER EXTREMITY GASTROCNEMIUS VEIN THROMBOSIS ASSOCIATED WITH LONG TRIP BY CAR.      History of Present Illness   DURING THE VISIT WITH THE PATIENT TODAY , PATIENT HAD FACE MASK, I HAD PROPER PROTECTIVE EQUIPMENT, AND I DID HAND HYGIENE WITH SOAP AND WATER BEFORE AND AFTER THE VISIT.  This patient returns today to the office for followup stating that he has been feeling terrific. He has gained some weight but otherwise he has not had any new episodes of blood clots in his lower extremities. On his long trips, he has been utilizing Lovenox as per previous instructions. He has not had any clinical bleeding. His appetite is good. His weight as stated. Normal bowel activity. He has proceeded with a colonoscopy. He wants for me to review the pathology report of this with him today. He has not had any difficulties with urination. He has a question in regard to androgen utilization in the background of possible hypogonadism. Nobody has measured testosterone levels on him though.          Past Medical History:   Diagnosis Date   • DVT (deep venous thrombosis) (CMS/[facility])         Past Surgical History:   Procedure Laterality Date   • KNEE ACL RECONSTRUCTION  2002   • OTHER SURGICAL HISTORY      cyst removed   • TOE SURGERY  2015   • VASECTOMY          Current Outpatient Medications on File Prior to Visit   Medication Sig Dispense Refill   • aspirin 81 MG chewable tablet Chew 81 mg Daily.     • enoxaparin (LOVENOX) 100 MG/ML solution syringe Use for long trips by car or airplane day of departure of each place 10 syringe 2     No current facility-administered medications on file prior to visit.        ALLERGIES:    No Known Allergies     Social History     Socioeconomic History   • Marital status:      Spouse name: Shima   • Number of children: 2   • Years of education: College   • Highest education level: Not on file   Tobacco Use   • Smoking status: Never Smoker   •  "Smokeless tobacco: Never Used   Substance and Sexual Activity   • Alcohol use: Yes     Comment: Occasional   • Drug use: No   • Sexual activity: Defer        Family History   Problem Relation Age of Onset   • Hypertension Father    • Pulmonary embolism Brother             Objective     Vitals:    04/29/21 0923   BP: 116/76   Pulse: 61   Resp: 20   Temp: 97.6 °F (36.4 °C)   TempSrc: Temporal   SpO2: 97%   Weight: 98.6 kg (217 lb 4.8 oz)   Height: 180 cm (70.87\")   PainSc: 0-No pain     Current Status 4/29/2021   ECOG score 0       Physical Exam    I HAVE PERSONALLY REVIEWED THE HISTORY OF THE PRESENT ILLNESS, PAST MEDICAL HISTORY, FAMILY HISTORY, SOCIAL HISTORY, ALLERGIES, MEDICATIONS STATED ABOVE IN THE OFFICE NOTE FROM TODAY.        GENERAL:  Well-developed, well-nourished  Patient  in no acute distress.   SKIN:  Warm, dry ,NO rashes,NO purpura ,NO petechiae.  HEENT:  Pupils were equal and reactive to light and accomodation, conjunctivae noninjected, no pterygium, normal extraocular movements, normal visual acuity.   NECK:  Supple with good range of motion; no thyromegaly or masses, no JVD or bruits, no cervical adenopathies.No carotid artery pain, no carotid abnormal pulsation , NO arterial dance.  LYMPHATICS:  No cervical, NO supraclavicular, NO axillary,NO epitrochlear , NO inguinal adenopathy.  CARDIAC   normal rate and regular rhythm, without murmur,NO rubs NO S3 NO S4 right or left . Normal femoral, popliteal, pedis, brachial and carotid pulses.  VASCULAR ARTERIAL: normal carotids,brachial,radial,femoral,popliteal, pedis pulses , no bruits.no paleness or cyanosis, no pain, no edema, no numbness, no gangrene.  VASCULAR VENOUS: no cyanosis, collateral circulation, varicosities, edema, palpable cords, pain, erythema.  ABDOMEN:  Soft, nontender with no hepatomegaly, no splenomegaly,no masses, no ascites, no collateral circulation,no distention,no Gerardo sign, no abdominal pain, no inguinal hernias,no umbilical " hernia, no abdominal bruits. Normal bowel sounds.  GENITAL: Not  Performed.  EXTREMITIES  AND SPINE:  No clubbing, cyanosis or edema, no deformities or pain .No kyphosis, scoliosis, deformities or pain in spine, ribs or pelvic bone.  NEUROLOGICAL:  Patient was awake, alert, oriented to time, person and place.                          RECENT LABS:  Hematology WBC   Date Value Ref Range Status   04/29/2021 6.81 3.40 - 10.80 10*3/mm3 Final   06/12/2019 6.38 3.40 - 10.80 10*3/mm3 Final     RBC   Date Value Ref Range Status   04/29/2021 4.66 4.14 - 5.80 10*6/mm3 Final   06/12/2019 4.72 4.14 - 5.80 10*6/mm3 Final     Hemoglobin   Date Value Ref Range Status   04/29/2021 14.6 13.0 - 17.7 g/dL Final     Hematocrit   Date Value Ref Range Status   04/29/2021 41.4 37.5 - 51.0 % Final     Platelets   Date Value Ref Range Status   04/29/2021 315 140 - 450 10*3/mm3 Final          Tissue Pathology Exam: [id number]  Order: [id number]  Status:  Final result   Visible to patient:  Yes (MyChart)   Next appt:  None   Dx:  Encounter for screening for malignant...  Specimen Information: 1: Large Intestine, Right / Ascending Colon; Tissue    2: Large Intestine, Hepatic Flexure; Tissue    3: Large Intestine, Sigmoid Colon; Tissue    4: Large Intestine, Sigmoid Colon; Tissue        Component    Case Report   Surgical Pathology Report                         Case: [id number]                                   Authorizing Provider:  Umberto Farrell MD    Collected:           01/26/2021 08:42 AM           Ordering Location:     [facility]  Received:            01/26/2021 10:51 AM                                  LABORATORY                                                                    Pathologist:           Stephanie Jay MD                                                     Specimens:   1) - Large Intestine, Right / Ascending Colon, ascending colon polyp                                 2) - Large Intestine,  Hepatic Flexure, hepatic flexure polyps                                        3) - Large Intestine, Sigmoid Colon, sigmoid polyp                                                   4) - Large Intestine, Sigmoid Colon, sigmoid polyp                                         Clinical Information    (+) Cologuard, hemorrhoids   Final Diagnosis   1.  Ascending Colon, Biopsy:               A.  Sessile serrated adenoma.     2.  Colon, Hepatic Flexure, Biopsy:                 A.  Tubular adenoma.                 B.  Negative for high grade dysplasia.       3. Sigmoid Colon, Polypectomy:                 A.  Tubular adenoma.                 B.  Negative for high grade dysplasia.                 C.  Stalk base, free of dysplasia.       4.  Sigmoid Colon, Biopsy:                 A.  Hyperplastic polyps.      swm/brb   Electronically signed by Stephanie Jay MD on 1/27/2021 at 1048   Gross Description                Assessment/Plan  In summary this very healthy 49-year-old white male who has had a blood clot in the gastrocnemius vein in the right lower extremity that was related to a long trip by car to [place]. The patient since initiation of Xarelto has done well and he had resolution of the symptoms 2 days after the initiation of the medicine. So far he has not had any symptoms that suggest postphlebitic syndrome, occasional discomfort at the clot site but he has not had any other problems.     The patient has had a brother who after twisting an ankle developed a significant vein thrombosis in the lower extremity that ended up with pulmonary embolism. According to the patient he has had blood testing that disclosed no abnormalities. Neither the patient's father or mother who are still alive have had blood clots. The patient has a sister who never had blood clots. He has 2 children who never have had blood clots. Therefore under the present premises I do believe that the patient will require continuation of Xarelto at  least for a total of 3 months    I reviewed with the patient on 08/23/2019 his thrombophilia labs. The only thing that shows up with abnormality is a positive lupus anticoagulant. The rest of the labs including factor V Leiden mutation, prothrombin gene mutation, protein C, protein S, antithrombin-III, anti-beta glycoprotein antibodies and anticardiolipin antibodies were negative.     On reviewing the patient on 11/15/2019 he has not had any new episodes of clots, he has not had any problems with the Xarelto that he takes on a regular basis 100% compliance. Again no clinical bleeding. Laboratory workup including a CBC that is normal. On the other hand his lupus anticoagulant is still pending. [facility] and the test will be available at some point next week.      The patient was further reviewed on 04/29/2021 after a long absence from the practice.  In the interim, he has utilized Lovenox as needed on the long trips that he does with his family to [place].  He has not had any episodes of blood clots and he has not had any clinical bleeding.  Actually, he is going to entertain 1 of these trips with his family in the summer and I will go ahead and renew his Lovenox injections to use 1 the day before the trip or the day of the trip, and another one the day they are returning back to [place].        In regard to the analysis of his colonoscopy, actually his Cologuard test was positive.  It is not surprising he had a serrated tubular adenoma in the ascending colon.  I pointed out to him that these kind of polyps are a different potentiality to become cancerous, and I think he needs to have a new colonoscopy in a couple of years.  He had removal of 2 more tubular adenomas and he had a hyperplastic polyp removed as well.  In none of them was there evidence of dysplasia.      In regard to the question that he has about testosterone utilization, I pointed out to him that most of  the so-called hypogonadism happens in patients who have central obesity and the abdominal fat kidnaps free testosterone.  If the abdominal fat disappears because of proper diet and proper physical activity, the balance will reestablish and the issue will disappear.      In somebody who has had thrombophilia, I would strongly not recommend any use of testosterone because of the high propensity for this medication to trigger thrombophilia and erythrocytosis.  The other issue that could be happening in the long run also is the possibility of ending up with simulation of prostate cells to become cancerous.  Therefore, in my opinion this medicine is not for him, and he agreed to proceed with this ideal.      I would like to review him back in a year with a CBC.    I will send a report of my note to the GI team who has performed the colonoscopy to make them aware that I strongly recommend a new colonoscopy in 2 years.

## 2021-06-11 ENCOUNTER — TELEMEDICINE (OUTPATIENT)
Dept: GASTROENTEROLOGY | Facility: CLINIC | Age: 51
End: 2021-06-11

## 2021-06-11 VITALS — BODY MASS INDEX: 29.12 KG/M2 | WEIGHT: 208 LBS

## 2021-06-11 DIAGNOSIS — Z86.010 HX OF ADENOMATOUS COLONIC POLYPS: Primary | ICD-10-CM

## 2021-06-11 DIAGNOSIS — Z12.11 COLON CANCER SCREENING: ICD-10-CM

## 2021-06-11 PROCEDURE — 99213 OFFICE O/P EST LOW 20 MIN: CPT | Performed by: NURSE PRACTITIONER

## 2021-06-11 NOTE — PATIENT INSTRUCTIONS
1. Plan for next surveillance colonoscopy at a 2 year interval due to history of colon polyps, which will be due on 1/26/2023.     2. Office follow up as needed.

## 2021-06-11 NOTE — PROGRESS NOTES
Chief Complaint   Patient presents with   • Follow-up     after colonoscopy         History of Present Illness  51-year-old male presents today for telemedicine follow-up.  He was last seen in office on 1/21/2021 for positive Cologuard testing.  He underwent colonoscopy on 1/26/2021 revealing a total of 5 colon polyps with one of the polyps being 30 mm in largest dimension located in the sigmoid colon.  Ascending colon polyp biopsy revealed a sessile serrated adenoma.  Hepatic flexure biopsy revealed a tubular adenoma.  Sigmoid colon polypectomy revealed tubular adenoma.  Sigmoid colon biopsy revealed hyperplastic polyps.  Based upon these findings and pathology patient was recommended to undergo his next surveillance colonoscopy in 2 years, which will be due on 1/26/2023.    The patient denies any change in bowel habits. He reports having regular daily BMs and denies having any diarrhea, constipation, melena or hematochezia. He denies any heartburn, reflux, nausea, vomiting, or dysphagia. His weight is stable.     You have chosen to receive care through a telehealth visit.  Do you consent to use a video/audio connection for your medical care today? Yes    Physical Exam  Constitutional:       General: He is not in acute distress.     Appearance: He is well-developed.   Pulmonary:      Effort: No respiratory distress.   Skin:     Coloration: Skin is not pale.          Review of Systems   Constitutional: Negative for fatigue, fever and unexpected weight change.   HENT: Negative for trouble swallowing.    Cardiovascular: Negative for chest pain.   Gastrointestinal: Negative for abdominal distention, abdominal pain, anal bleeding, blood in stool, constipation, diarrhea, nausea, rectal pain and vomiting.         Result Review :     Office Visit with Umberto Farrell MD (01/21/2021)  SCANNED - COLONOSCOPY (01/26/2021)  Tissue Pathology Exam (01/26/2021 08:42)  Cologuard - Stool, Per Rectum (12/08/2020)      Assessment  and Plan    Diagnoses and all orders for this visit:    1. Hx of adenomatous colonic polyps (Primary)    2. Colon cancer screening       I spent 12 minutes caring for Thomas on this date of service. This time includes time spent by me in the following activities:preparing for the visit, reviewing tests, performing a medically appropriate examination and/or evaluation , counseling and educating the patient/family/caregiver and documenting information in the medical record      Return if symptoms worsen or fail to improve.    Patient Instructions   1. Plan for next surveillance colonoscopy at a 2 year interval due to history of colon polyps, which will be due on 1/26/2023.     2. Office follow up as needed.      Discussion:    Colonoscopy findings and pathology reviewed with patient today during his telemedicine office visit. Due to findings of multiple polyps and a large 30 mm polyp which were adenomatous we will plan for surveillance colonoscopy in 2 years and patient has been placed in recall accordingly. Patient was instructed to contact the office should he experience any change in bowel habits, rectal bleeding, or blood in the stool. Patient verbalized understanding of above plan of care and is in agreement. All questions answered and support provided.     EMR Dragon/Transcription Disclaimer:  This document has been Dictated utilizing Dragon dictation.

## 2022-04-28 ENCOUNTER — APPOINTMENT (OUTPATIENT)
Dept: LAB | Facility: HOSPITAL | Age: 52
End: 2022-04-28

## 2022-05-17 ENCOUNTER — TELEPHONE (OUTPATIENT)
Dept: ONCOLOGY | Facility: CLINIC | Age: 52
End: 2022-05-17

## 2022-05-17 NOTE — TELEPHONE ENCOUNTER
DELETE AFTER REVIEWING: Telephone encounter to be sent to the clinical pool     Caller: Thomas Silvestre    Relationship to patient: Self    Best call back number: [phone]    Chief complaint: R/S    Type of visit: LAB AND FOLLOW UP    Requested date: NA EARLY IN THE MORNING    If rescheduling, when is the original appointment: 4-28     Additional notes:PLEASE ADVISE

## 2022-05-31 ENCOUNTER — APPOINTMENT (OUTPATIENT)
Dept: LAB | Facility: HOSPITAL | Age: 52
End: 2022-05-31

## 2022-06-06 ENCOUNTER — LAB (OUTPATIENT)
Dept: LAB | Facility: HOSPITAL | Age: 52
End: 2022-06-06

## 2022-06-06 ENCOUNTER — OFFICE VISIT (OUTPATIENT)
Dept: ONCOLOGY | Facility: CLINIC | Age: 52
End: 2022-06-06

## 2022-06-06 VITALS
HEART RATE: 59 BPM | BODY MASS INDEX: 30.76 KG/M2 | OXYGEN SATURATION: 97 % | TEMPERATURE: 97.1 F | RESPIRATION RATE: 16 BRPM | WEIGHT: 219.7 LBS | DIASTOLIC BLOOD PRESSURE: 77 MMHG | SYSTOLIC BLOOD PRESSURE: 110 MMHG | HEIGHT: 71 IN

## 2022-06-06 DIAGNOSIS — I82.401 ACUTE DEEP VEIN THROMBOSIS (DVT) OF RIGHT LOWER EXTREMITY, UNSPECIFIED VEIN: Primary | ICD-10-CM

## 2022-06-06 DIAGNOSIS — I82.441 ACUTE DEEP VEIN THROMBOSIS (DVT) OF TIBIAL VEIN OF RIGHT LOWER EXTREMITY: Primary | ICD-10-CM

## 2022-06-06 LAB
BASOPHILS # BLD AUTO: 0.07 10*3/MM3 (ref 0–0.2)
BASOPHILS NFR BLD AUTO: 1 % (ref 0–1.5)
DEPRECATED RDW RBC AUTO: 42.7 FL (ref 37–54)
EOSINOPHIL # BLD AUTO: 0.25 10*3/MM3 (ref 0–0.4)
EOSINOPHIL NFR BLD AUTO: 3.6 % (ref 0.3–6.2)
ERYTHROCYTE [DISTWIDTH] IN BLOOD BY AUTOMATED COUNT: 13 % (ref 12.3–15.4)
HCT VFR BLD AUTO: 42.4 % (ref 37.5–51)
HGB BLD-MCNC: 14.6 G/DL (ref 13–17.7)
IMM GRANULOCYTES # BLD AUTO: 0.01 10*3/MM3 (ref 0–0.05)
IMM GRANULOCYTES NFR BLD AUTO: 0.1 % (ref 0–0.5)
LYMPHOCYTES # BLD AUTO: 2.13 10*3/MM3 (ref 0.7–3.1)
LYMPHOCYTES NFR BLD AUTO: 30.9 % (ref 19.6–45.3)
MCH RBC QN AUTO: 31.3 PG (ref 26.6–33)
MCHC RBC AUTO-ENTMCNC: 34.4 G/DL (ref 31.5–35.7)
MCV RBC AUTO: 91 FL (ref 79–97)
MONOCYTES # BLD AUTO: 0.78 10*3/MM3 (ref 0.1–0.9)
MONOCYTES NFR BLD AUTO: 11.3 % (ref 5–12)
NEUTROPHILS NFR BLD AUTO: 3.65 10*3/MM3 (ref 1.7–7)
NEUTROPHILS NFR BLD AUTO: 53.1 % (ref 42.7–76)
NRBC BLD AUTO-RTO: 0 /100 WBC (ref 0–0.2)
PLATELET # BLD AUTO: 287 10*3/MM3 (ref 140–450)
PMV BLD AUTO: 9.3 FL (ref 6–12)
RBC # BLD AUTO: 4.66 10*6/MM3 (ref 4.14–5.8)
WBC NRBC COR # BLD: 6.89 10*3/MM3 (ref 3.4–10.8)

## 2022-06-06 PROCEDURE — 36415 COLL VENOUS BLD VENIPUNCTURE: CPT

## 2022-06-06 PROCEDURE — 99213 OFFICE O/P EST LOW 20 MIN: CPT | Performed by: INTERNAL MEDICINE

## 2022-06-06 PROCEDURE — 85025 COMPLETE CBC W/AUTO DIFF WBC: CPT

## 2022-06-06 NOTE — PROGRESS NOTES
Subjective     REASON FOR FOLLOW UP:  RIGHT LOWER EXTREMITY GASTROCNEMIUS VEIN THROMBOSIS ASSOCIATED WITH LONG TRIP BY CAR.      History of Present Illness   DURING THE VISIT WITH THE PATIENT TODAY , PATIENT HAD FACE MASK, I HAD PROPER PROTECTIVE EQUIPMENT, AND I DID HAND HYGIENE WITH SOAP AND WATER BEFORE AND AFTER THE VISIT.    This patient returns today to the office for followup of the above diagnosis.  He is here with no new issues in regard to thrombophilia.  He continues using Lovenox on long trips by car without any difficulties with bleeding and again no new thrombosis in the lower extremity veins.  His appetite and weight remain stable.  He has no respiratory infections, no GI symptoms, and no urological problems of any nature.  He continues being very busy at home and having plenty physical activity.  His weight remains stable.  He wants for me to review multiple moles that he has on the skin.  Otherwise no new health problems.            Past Medical History:   Diagnosis Date   • DVT (deep venous thrombosis) (HCC)         Past Surgical History:   Procedure Laterality Date   • KNEE ACL RECONSTRUCTION  2002   • OTHER SURGICAL HISTORY      cyst removed   • TOE SURGERY  2015   • VASECTOMY          Current Outpatient Medications on File Prior to Visit   Medication Sig Dispense Refill   • aspirin 81 MG chewable tablet Chew 81 mg Daily.     • enoxaparin (LOVENOX) 100 MG/ML solution syringe Use for long trips by car or airplane day of departure of each place 10 each 3     No current facility-administered medications on file prior to visit.        ALLERGIES:    No Known Allergies     Social History     Socioeconomic History   • Marital status:      Spouse name: Shima   • Number of children: 2   • Years of education: College   Tobacco Use   • Smoking status: Never Smoker   • Smokeless tobacco: Never Used   Substance and Sexual Activity   • Alcohol use: Yes     Comment: Occasional   • Drug use: No   •  "Sexual activity: Defer        Family History   Problem Relation Age of Onset   • Hypertension Father    • Pulmonary embolism Brother             Objective     Vitals:    06/06/22 0745   BP: 110/77   Pulse: 59   Resp: 16   Temp: 97.1 °F (36.2 °C)   TempSrc: Temporal   SpO2: 97%   Weight: 99.7 kg (219 lb 11.2 oz)   Height: 180.3 cm (71\")   PainSc: 0-No pain     Current Status 4/29/2021   ECOG score 0       Physical Exam    I HAVE PERSONALLY REVIEWED THE HISTORY OF THE PRESENT ILLNESS, PAST MEDICAL HISTORY, FAMILY HISTORY, SOCIAL HISTORY, ALLERGIES, MEDICATIONS STATED ABOVE IN THE  NOTE FROM TODAY.        GENERAL:  Well-developed, well-nourished  Patient  in no acute distress.   SKIN:  Warm, dry ,NO purpura ,NO petechiae, no rash.He has multiple moles on the skin of his back and his chest wall.  All of them are intradermal nevi without any significance.  He has a seborrheic keratosis on the forehead on the left side that has no significance.      HEENT:  Pupils were equal and reactive to light and accomodation, conjunctivae noninjected, no pterygium, normal extraocular movements, normal visual acuity.   NECK:  Supple with good range of motion; no thyromegaly , no other masses, no JVD or bruits,.No carotid artery pain, no carotid abnormal pulsation , NO arterial dance.  LYMPHATICS:  No cervical, NO supraclavicular, NO axillary,NO epitrochlear , NO inguinal adenopathies.  CARDIAC   normal rate , regular rhythm, without murmur,NO rubs NO S3 NO S4   LUNGS: normal breath sounds bilateral, no wheezing, NO rhonchi, NO crackles ,NO rubs.  VASCULAR VENOUS: no cyanosis, NO collateral circulation, NO varicosities, NO edema, NO palpable cords, NO pain,NO erythema, NO pigmentation of the skin.  ABDOMEN:  Soft, NO pain,no hepatomegaly, no splenomegaly,no masses, no ascites, no collateral circulation,no distention,no Gerardo sign.  EXTREMITIES  AND SPINE:  No clubbing, no cyanosis ,no deformities , no pain .No kyphosis,  no pain in " spine, no pain in ribs , no pain in pelvic bone.  NEUROLOGICAL:  Patient was awake, alert, oriented to time, person and place.                            RECENT LABS:  Hematology WBC   Date Value Ref Range Status   06/06/2022 6.89 3.40 - 10.80 10*3/mm3 Final   06/12/2019 6.38 3.40 - 10.80 10*3/mm3 Final     RBC   Date Value Ref Range Status   06/06/2022 4.66 4.14 - 5.80 10*6/mm3 Final   06/12/2019 4.72 4.14 - 5.80 10*6/mm3 Final     Hemoglobin   Date Value Ref Range Status   06/06/2022 14.6 13.0 - 17.7 g/dL Final     Hematocrit   Date Value Ref Range Status   06/06/2022 42.4 37.5 - 51.0 % Final     Platelets   Date Value Ref Range Status   06/06/2022 287 140 - 450 10*3/mm3 Final                  Assessment & Plan  In summary this very healthy 49-year-old white male who has had a blood clot in the gastrocnemius vein in the right lower extremity that was related to a long trip by car to [place]. The patient since initiation of Xarelto has done well and he had resolution of the symptoms 2 days after the initiation of the medicine. So far he has not had any symptoms that suggest postphlebitic syndrome, occasional discomfort at the clot site but he has not had any other problems.     The patient has had a brother who after twisting an ankle developed a significant vein thrombosis in the lower extremity that ended up with pulmonary embolism. According to the patient he has had blood testing that disclosed no abnormalities. Neither the patient's father or mother who are still alive have had blood clots. The patient has a sister who never had blood clots. He has 2 children who never have had blood clots. Therefore under the present premises I do believe that the patient will require continuation of Xarelto at least for a total of 3 months    I reviewed with the patient on 08/23/2019 his thrombophilia labs. The only thing that shows up with abnormality is a positive lupus anticoagulant. The rest of the labs including  factor V Leiden mutation, prothrombin gene mutation, protein C, protein S, antithrombin-III, anti-beta glycoprotein antibodies and anticardiolipin antibodies were negative.     On reviewing the patient on 11/15/2019 he has not had any new episodes of clots, he has not had any problems with the Xarelto that he takes on a regular basis 100% compliance. Again no clinical bleeding. Laboratory workup including a CBC that is normal. On the other hand his lupus anticoagulant is still pending. [facility] and the test will be available at some point next week.      The patient was further reviewed on 04/29/2021 after a long absence from the practice.  In the interim, he has utilized Lovenox as needed on the long trips that he does with his family to [place].  He has not had any episodes of blood clots and he has not had any clinical bleeding.  Actually, he is going to entertain 1 of these trips with his family in the summer and I will go ahead and renew his Lovenox injections to use 1 the day before the trip or the day of the trip, and another one the day they are returning back to [place].        In regard to the analysis of his colonoscopy, actually his Cologuard test was positive.  It is not surprising he had a serrated tubular adenoma in the ascending colon.  I pointed out to him that these kind of polyps are a different potentiality to become cancerous, and I think he needs to have a new colonoscopy in a couple of years.  He had removal of 2 more tubular adenomas and he had a hyperplastic polyp removed as well.  In none of them was there evidence of dysplasia.      In regard to the question that he has about testosterone utilization, I pointed out to him that most of the so-called hypogonadism happens in patients who have central obesity and the abdominal fat kidnaps free testosterone.  If the abdominal fat disappears because of proper diet and proper physical activity, the  balance will reestablish and the issue will disappear.      In somebody who has had thrombophilia, I would strongly not recommend any use of testosterone because of the high propensity for this medication to trigger thrombophilia and erythrocytosis.  The other issue that could be happening in the long run also is the possibility of ending up with simulation of prostate cells to become cancerous.  Therefore, in my opinion this medicine is not for him, and he agreed to proceed with this ideal.      I would like to review him back in a year with a CBC.    I will send a report of my note to the GI team who has performed the colonoscopy to make them aware that I strongly recommend a new colonoscopy in 2 years.    The patient was reviewed on 06/06/2022.  Since the previous visit he has not had any other episodes of thrombophilia.  He continues using Lovenox prophylactically on the days of long trips by car without any bleeding and without any new thrombosis.  He has no evidence of postphlebitic syndrome.      His overall health remains excellent.  Otherwise no other issues or problems.  His clinical examination today is normal.  I advised him that next year he will require to have a new colonoscopy given the pathology of the report of the previous colonoscopy last year showing a serrated adenoma and this will require to be reassessed.      Otherwise he is welcome to call for Lovenox refills whenever he needs them for long trips by car or airplane.      ·

## 2022-07-08 RX ORDER — ENOXAPARIN SODIUM 100 MG/ML
INJECTION SUBCUTANEOUS
Qty: 10 ML | Refills: 3 | Status: SHIPPED | OUTPATIENT
Start: 2022-07-08

## 2023-05-09 ENCOUNTER — LAB (OUTPATIENT)
Dept: LAB | Facility: HOSPITAL | Age: 53
End: 2023-05-09
Payer: COMMERCIAL

## 2023-05-09 ENCOUNTER — OFFICE VISIT (OUTPATIENT)
Dept: ONCOLOGY | Facility: CLINIC | Age: 53
End: 2023-05-09
Payer: COMMERCIAL

## 2023-05-09 VITALS
HEART RATE: 62 BPM | WEIGHT: 214.8 LBS | SYSTOLIC BLOOD PRESSURE: 119 MMHG | DIASTOLIC BLOOD PRESSURE: 83 MMHG | BODY MASS INDEX: 30.07 KG/M2 | HEIGHT: 71 IN | TEMPERATURE: 98.2 F | RESPIRATION RATE: 16 BRPM | OXYGEN SATURATION: 99 %

## 2023-05-09 DIAGNOSIS — I82.441 ACUTE DEEP VEIN THROMBOSIS (DVT) OF TIBIAL VEIN OF RIGHT LOWER EXTREMITY: ICD-10-CM

## 2023-05-09 DIAGNOSIS — I82.441 ACUTE DEEP VEIN THROMBOSIS (DVT) OF TIBIAL VEIN OF RIGHT LOWER EXTREMITY: Primary | ICD-10-CM

## 2023-05-09 LAB
ALBUMIN SERPL-MCNC: 4.2 G/DL (ref 3.5–5.2)
ALBUMIN/GLOB SERPL: 1.8 G/DL (ref 1.1–2.4)
ALP SERPL-CCNC: 68 U/L (ref 38–116)
ALT SERPL W P-5'-P-CCNC: 15 U/L (ref 0–41)
ANION GAP SERPL CALCULATED.3IONS-SCNC: 10 MMOL/L (ref 5–15)
AST SERPL-CCNC: 13 U/L (ref 0–40)
BASOPHILS # BLD AUTO: 0.08 10*3/MM3 (ref 0–0.2)
BASOPHILS NFR BLD AUTO: 1.2 % (ref 0–1.5)
BILIRUB SERPL-MCNC: 0.4 MG/DL (ref 0.2–1.2)
BUN SERPL-MCNC: 11 MG/DL (ref 6–20)
BUN/CREAT SERPL: 10.8 (ref 7.3–30)
CALCIUM SPEC-SCNC: 9.4 MG/DL (ref 8.5–10.2)
CHLORIDE SERPL-SCNC: 103 MMOL/L (ref 98–107)
CO2 SERPL-SCNC: 27 MMOL/L (ref 22–29)
CREAT SERPL-MCNC: 1.02 MG/DL (ref 0.7–1.3)
DEPRECATED RDW RBC AUTO: 44.6 FL (ref 37–54)
EGFRCR SERPLBLD CKD-EPI 2021: 86.8 ML/MIN/1.73
EOSINOPHIL # BLD AUTO: 0.21 10*3/MM3 (ref 0–0.4)
EOSINOPHIL NFR BLD AUTO: 3.2 % (ref 0.3–6.2)
ERYTHROCYTE [DISTWIDTH] IN BLOOD BY AUTOMATED COUNT: 13.2 % (ref 12.3–15.4)
GLOBULIN UR ELPH-MCNC: 2.4 GM/DL (ref 1.8–3.5)
GLUCOSE SERPL-MCNC: 101 MG/DL (ref 74–124)
HCT VFR BLD AUTO: 42.3 % (ref 37.5–51)
HGB BLD-MCNC: 14 G/DL (ref 13–17.7)
IMM GRANULOCYTES # BLD AUTO: 0.03 10*3/MM3 (ref 0–0.05)
IMM GRANULOCYTES NFR BLD AUTO: 0.5 % (ref 0–0.5)
LYMPHOCYTES # BLD AUTO: 1.83 10*3/MM3 (ref 0.7–3.1)
LYMPHOCYTES NFR BLD AUTO: 28.1 % (ref 19.6–45.3)
MCH RBC QN AUTO: 30.6 PG (ref 26.6–33)
MCHC RBC AUTO-ENTMCNC: 33.1 G/DL (ref 31.5–35.7)
MCV RBC AUTO: 92.4 FL (ref 79–97)
MONOCYTES # BLD AUTO: 0.69 10*3/MM3 (ref 0.1–0.9)
MONOCYTES NFR BLD AUTO: 10.6 % (ref 5–12)
NEUTROPHILS NFR BLD AUTO: 3.67 10*3/MM3 (ref 1.7–7)
NEUTROPHILS NFR BLD AUTO: 56.4 % (ref 42.7–76)
NRBC BLD AUTO-RTO: 0 /100 WBC (ref 0–0.2)
PLATELET # BLD AUTO: 341 10*3/MM3 (ref 140–450)
PMV BLD AUTO: 9.3 FL (ref 6–12)
POTASSIUM SERPL-SCNC: 4.6 MMOL/L (ref 3.5–4.7)
PROT SERPL-MCNC: 6.6 G/DL (ref 6.3–8)
RBC # BLD AUTO: 4.58 10*6/MM3 (ref 4.14–5.8)
SODIUM SERPL-SCNC: 140 MMOL/L (ref 134–145)
WBC NRBC COR # BLD: 6.51 10*3/MM3 (ref 3.4–10.8)

## 2023-05-09 PROCEDURE — 85025 COMPLETE CBC W/AUTO DIFF WBC: CPT

## 2023-05-09 PROCEDURE — 80053 COMPREHEN METABOLIC PANEL: CPT

## 2023-05-09 PROCEDURE — 36415 COLL VENOUS BLD VENIPUNCTURE: CPT

## 2023-05-09 NOTE — PROGRESS NOTES
Subjective     REASON FOR FOLLOW UP:  RIGHT LOWER EXTREMITY GASTROCNEMIUS VEIN THROMBOSIS ASSOCIATED WITH LONG TRIP BY CAR.THROMBOPHILIA ASSESSMENT  NEGATIVE OTHERWISE FOR OTHER CAUSES OF CLOTHS.      History of Present Illness   On 05/09/2023 this 53-year-old who has previous history of gastrocnemius vein thrombosis associated with a long trip in a car and negative thrombophilia assessment otherwise and has been off anticoagulants for a long time returns to the office for follow up.    In the interim he has not had any new issues related to his peripheral flow in veins or arteries in his lower extremities. He has had many trips using Lovenox prophylactically without any consequences and without any obvious clots. He has not developed any varicosities. The rest of his health remains excellent. He is aware that he needs to have a new colonoscopy this year given the fact that he had a serrated polyp that was removed 2 years ago. He wants me to review his skin, he has had mole removed before.             Past Medical History:   Diagnosis Date   • DVT (deep venous thrombosis)         Past Surgical History:   Procedure Laterality Date   • KNEE ACL RECONSTRUCTION  2002   • OTHER SURGICAL HISTORY      cyst removed   • TOE SURGERY  2015   • VASECTOMY          Current Outpatient Medications on File Prior to Visit   Medication Sig Dispense Refill   • aspirin 81 MG chewable tablet Chew 1 tablet Daily.     • Enoxaparin Sodium (LOVENOX) 100 MG/ML solution prefilled syringe syringe USE FOR LONG TRIPS OR AIRPLANE DAY OF DEPARTURE OF EACH PLACE 10 mL 3     No current facility-administered medications on file prior to visit.        ALLERGIES:    No Known Allergies     Social History     Socioeconomic History   • Marital status:      Spouse name: Shima   • Number of children: 2   • Years of education: College   Tobacco Use   • Smoking status: Never   • Smokeless tobacco: Never   Substance and Sexual Activity   • Alcohol  "use: Yes     Comment: Occasional   • Drug use: No   • Sexual activity: Defer        Family History   Problem Relation Age of Onset   • Hypertension Father    • Pulmonary embolism Brother             Objective     Vitals:    05/09/23 0822   BP: 119/83   Pulse: 62   Resp: 16   Temp: 98.2 °F (36.8 °C)   TempSrc: Temporal   SpO2: 99%   Weight: 97.4 kg (214 lb 12.8 oz)   Height: 180.3 cm (70.98\")   PainSc: 0-No pain         5/9/2023     8:19 AM   Current Status   ECOG score 0       Physical Exam          GENERAL:  Well-developed, Patient  in no acute distress.   SKIN:  Warm, dry ,NO purpura ,no rash.He has multiple small hyperpigmented lesions in the lower back that need to be checked by dermatologist. Multiple pigmented lesions in the upper back consistent with seborrheic keratosis or intraskin nevus with no implications. No lesions on the anterior abdomen, upper extremities or lower extremities.       HEENT:  Pupils were equal and reactive to light and accomodation, conjunctivae noninjected,  normal visual acuity.   NECK:  Supple with good range of motion; no thyromegaly , no JVD or bruits,.No carotid artery pain, no carotid abnormal pulsation   LYMPHATICS:  No cervical, NO supraclavicular, NO axillary, NO inguinal adenopathies.  CARDIAC   normal rate , regular rhythm, without murmur,NO rubs NO S3 NO S4   LUNGS: normal breath sounds bilateral, no wheezing, NO rhonchi, NO crackles ,NO rubs.  VASCULAR VENOUS: no cyanosis, NO collateral circulation, NO varicosities, NO edema, NO palpable cords, NO pain,NO erythema, NO pigmentation of the skin.  ABDOMEN:  Soft, NO pain,no hepatomegaly, no splenomegaly,no masses, no ascites, no collateral circulation,no distention.  EXTREMITIES  AND SPINE:  No clubbing, no cyanosis ,no deformities , no pain .No kyphosis,  no pain in spine, no pain in ribs , no pain in pelvic bone.  NEUROLOGICAL:  Patient was awake, alert, oriented to time, person and " place.                              RECENT LABS:  Hematology WBC   Date Value Ref Range Status   05/09/2023 6.51 3.40 - 10.80 10*3/mm3 Final   06/12/2019 6.38 3.40 - 10.80 10*3/mm3 Final     RBC   Date Value Ref Range Status   05/09/2023 4.58 4.14 - 5.80 10*6/mm3 Final   06/12/2019 4.72 4.14 - 5.80 10*6/mm3 Final     Hemoglobin   Date Value Ref Range Status   05/09/2023 14.0 13.0 - 17.7 g/dL Final     Hematocrit   Date Value Ref Range Status   05/09/2023 42.3 37.5 - 51.0 % Final     Platelets   Date Value Ref Range Status   05/09/2023 341 140 - 450 10*3/mm3 Final                  Assessment & Plan  In summary this very healthy 53-year-old white male who has had a blood clot in the gastrocnemius vein in the right lower extremity that was related to a long trip by car to [place]. The patient since initiation of Xarelto has done well and he had resolution of the symptoms 2 days after the initiation of the medicine. So far he has not had any symptoms that suggest postphlebitic syndrome, occasional discomfort at the clot site but he has not had any other problems.     The patient has had a brother who after twisting an ankle developed a significant vein thrombosis in the lower extremity that ended up with pulmonary embolism. According to the patient he has had blood testing that disclosed no abnormalities. Neither the patient's father or mother who are still alive have had blood clots. The patient has a sister who never had blood clots. He has 2 children who never have had blood clots. Therefore under the present premises I do believe that the patient will require continuation of Xarelto at least for a total of 3 months    I reviewed with the patient on 08/23/2019 his thrombophilia labs. The only thing that shows up with abnormality is a positive lupus anticoagulant. The rest of the labs including factor V Leiden mutation, prothrombin gene mutation, protein C, protein S, antithrombin-III, anti-beta glycoprotein  antibodies and anticardiolipin antibodies were negative.     On reviewing the patient on 11/15/2019 he has not had any new episodes of clots, he has not had any problems with the Xarelto that he takes on a regular basis 100% compliance. Again no clinical bleeding. Laboratory workup including a CBC that is normal. On the other hand his lupus anticoagulant is still pending. [facility] and the test will be available at some point next week.      The patient was further reviewed on 04/29/2021 after a long absence from the practice.  In the interim, he has utilized Lovenox as needed on the long trips that he does with his family to [place].  He has not had any episodes of blood clots and he has not had any clinical bleeding.  Actually, he is going to entertain 1 of these trips with his family in the summer and I will go ahead and renew his Lovenox injections to use 1 the day before the trip or the day of the trip, and another one the day they are returning back to [place].        In regard to the analysis of his colonoscopy, actually his Cologuard test was positive.  It is not surprising he had a serrated tubular adenoma in the ascending colon.  I pointed out to him that these kind of polyps are a different potentiality to become cancerous, and I think he needs to have a new colonoscopy in a couple of years.  He had removal of 2 more tubular adenomas and he had a hyperplastic polyp removed as well.  In none of them was there evidence of dysplasia.      In regard to the question that he has about testosterone utilization, I pointed out to him that most of the so-called hypogonadism happens in patients who have central obesity and the abdominal fat kidnaps free testosterone.  If the abdominal fat disappears because of proper diet and proper physical activity, the balance will reestablish and the issue will disappear.      In somebody who has had thrombophilia, I would strongly  not recommend any use of testosterone because of the high propensity for this medication to trigger thrombophilia and erythrocytosis.  The other issue that could be happening in the long run also is the possibility of ending up with simulation of prostate cells to become cancerous.  Therefore, in my opinion this medicine is not for him, and he agreed to proceed with this ideal.      I would like to review him back in a year with a CBC.    I will send a report of my note to the GI team who has performed the colonoscopy to make them aware that I strongly recommend a new colonoscopy in 2 years.    The patient was reviewed on 06/06/2022.  Since the previous visit he has not had any other episodes of thrombophilia.  He continues using Lovenox prophylactically on the days of long trips by car without any bleeding and without any new thrombosis.  He has no evidence of postphlebitic syndrome.      His overall health remains excellent.  Otherwise no other issues or problems.  His clinical examination today is normal.  I advised him that next year he will require to have a new colonoscopy given the pathology of the report of the previous colonoscopy last year showing a serrated adenoma and this will require to be reassessed.      Otherwise he is welcome to call for Lovenox refills whenever he needs them for long trips by car or airplane.    On 05/09/2023 the patient has not had any symptoms pertinent to blood clots in the meantime. He uses Lovenox for long trips by car or airplane. The day of the trip he takes one, the day coming back he takes back he takes another one. He has not had any clinical bleeding and he has not had any thrombosis. Otherwise he feels fine. His clinical examination is normal, on the other hand he has a few lesions in the skin, pigmented with certain irregularity and darker color than usual in the lower back that need to be seen by dermatologist.     He also will need to have a colonoscopy this year  given the fact that 2 years ago he had a serrated adenoma removed. He is aware of that. Otherwise I will be reviewing him back in a year. He has refills on Lovenox and I asked him to call if he needs anymore.       ·

## 2023-05-25 ENCOUNTER — TELEPHONE (OUTPATIENT)
Dept: GASTROENTEROLOGY | Facility: CLINIC | Age: 53
End: 2023-05-25

## 2023-05-25 NOTE — TELEPHONE ENCOUNTER
Provider: DR LOZA    Caller: FRITZ     Relationship to Patient: WIFE    Phone Number: [phone]    Reason for Call: PATIENT WIFE CALLED IN AND IS WANTING TO KNOW IF THE COLONOSCOPY THE PATIENT IS DUE FOR CODED FOR PREVENTIVE OR DIAGNOSTICS. SHE SAID SHE NEEDS TO KNOW FOR INSURANCE PURPOSES SO SHE NEEDS IF THEY WILL HAVE TO PAY OUT OF POCKET. SHE HAS TO PICK THEIR NEW INSURANCE POLICY IN THE MORNING AND REQUESTING A CALL BACK AS SOON AS POSSIBLE PLEASE.

## 2024-01-12 RX ORDER — ENOXAPARIN SODIUM 100 MG/ML
INJECTION SUBCUTANEOUS
Qty: 10 ML | Refills: 3 | Status: SHIPPED | OUTPATIENT
Start: 2024-01-12

## 2024-01-31 ENCOUNTER — TELEPHONE (OUTPATIENT)
Dept: GASTROENTEROLOGY | Facility: CLINIC | Age: 54
End: 2024-01-31
Payer: COMMERCIAL

## 2024-01-31 NOTE — TELEPHONE ENCOUNTER
"    Caller: Thomas Silvestre \"PERLA\"    Relationship to patient: Self    Best call back number: [phone]     Chief complaint: PATIENT'S LAST COLONOSCOPY WAS ON 1/26/21.  PATIENT IS OVERDUE AND WOULD LIKE TO SCHEDULE.    Type of visit: COLONOSCOPY     Requested date: FIRST AVAILABLE     If rescheduling, when is the original appointment:      Additional notes:  PLEASE CALL TO SCHEDULE.          "

## 2024-02-20 ENCOUNTER — TELEPHONE (OUTPATIENT)
Dept: GASTROENTEROLOGY | Facility: CLINIC | Age: 54
End: 2024-02-20
Payer: COMMERCIAL

## 2024-02-21 ENCOUNTER — PREP FOR SURGERY (OUTPATIENT)
Dept: SURGERY | Facility: SURGERY CENTER | Age: 54
End: 2024-02-21
Payer: COMMERCIAL

## 2024-02-21 DIAGNOSIS — Z86.010 PERSONAL HISTORY OF COLONIC POLYPS: ICD-10-CM

## 2024-02-21 DIAGNOSIS — Z12.11 ENCOUNTER FOR SCREENING FOR MALIGNANT NEOPLASM OF COLON: Primary | ICD-10-CM

## 2024-02-21 DIAGNOSIS — D12.2 ADENOMATOUS POLYP OF ASCENDING COLON: ICD-10-CM

## 2024-02-21 RX ORDER — SODIUM CHLORIDE, SODIUM LACTATE, POTASSIUM CHLORIDE, CALCIUM CHLORIDE 600; 310; 30; 20 MG/100ML; MG/100ML; MG/100ML; MG/100ML
30 INJECTION, SOLUTION INTRAVENOUS CONTINUOUS PRN
OUTPATIENT
Start: 2024-02-21

## 2024-02-21 RX ORDER — SODIUM CHLORIDE 0.9 % (FLUSH) 0.9 %
3 SYRINGE (ML) INJECTION EVERY 12 HOURS SCHEDULED
OUTPATIENT
Start: 2024-02-21

## 2024-02-21 RX ORDER — SODIUM CHLORIDE 0.9 % (FLUSH) 0.9 %
10 SYRINGE (ML) INJECTION AS NEEDED
OUTPATIENT
Start: 2024-02-21

## 2024-02-22 PROBLEM — Z12.11 ENCOUNTER FOR SCREENING FOR MALIGNANT NEOPLASM OF COLON: Status: ACTIVE | Noted: 2024-02-21

## 2024-02-22 PROBLEM — Z86.0100 PERSONAL HISTORY OF COLONIC POLYPS: Status: ACTIVE | Noted: 2024-02-21

## 2024-02-22 PROBLEM — Z86.010 PERSONAL HISTORY OF COLONIC POLYPS: Status: ACTIVE | Noted: 2024-02-21

## 2024-02-28 ENCOUNTER — TELEPHONE (OUTPATIENT)
Dept: GASTROENTEROLOGY | Facility: CLINIC | Age: 54
End: 2024-02-28
Payer: COMMERCIAL

## 2024-02-28 NOTE — TELEPHONE ENCOUNTER
"Caller: Thomas Silvestre \"PERLA\"    Relationship to patient: Self    Best call back number: [phone]    Chief complaint:     Type of visit: CLS    Requested date: PLEASE CALL TO SCHEDULE     If rescheduling, when is the original appointment: 3/14/24     Additional notes: PT  RIDE IS ONLY AVAILABLE ON A MONDAY.    ORIGINAL MESSAGE 2/20/24  "

## 2024-03-27 ENCOUNTER — PREP FOR SURGERY (OUTPATIENT)
Dept: SURGERY | Facility: SURGERY CENTER | Age: 54
End: 2024-03-27
Payer: COMMERCIAL

## 2024-03-27 DIAGNOSIS — Z12.11 ENCOUNTER FOR SCREENING FOR MALIGNANT NEOPLASM OF COLON: Primary | ICD-10-CM

## 2024-03-27 DIAGNOSIS — D12.2 ADENOMATOUS POLYP OF ASCENDING COLON: ICD-10-CM

## 2024-03-27 DIAGNOSIS — Z86.010 PERSONAL HISTORY OF COLONIC POLYPS: ICD-10-CM

## 2024-03-27 RX ORDER — SODIUM CHLORIDE 0.9 % (FLUSH) 0.9 %
10 SYRINGE (ML) INJECTION AS NEEDED
OUTPATIENT
Start: 2024-03-27

## 2024-03-27 RX ORDER — SODIUM CHLORIDE 0.9 % (FLUSH) 0.9 %
3 SYRINGE (ML) INJECTION EVERY 12 HOURS SCHEDULED
OUTPATIENT
Start: 2024-03-27

## 2024-03-27 RX ORDER — SODIUM CHLORIDE, SODIUM LACTATE, POTASSIUM CHLORIDE, CALCIUM CHLORIDE 600; 310; 30; 20 MG/100ML; MG/100ML; MG/100ML; MG/100ML
30 INJECTION, SOLUTION INTRAVENOUS CONTINUOUS PRN
OUTPATIENT
Start: 2024-03-27

## 2024-04-18 NOTE — SIGNIFICANT NOTE
Education provided the Patient on the following:    - Nothing to Eat or Drink after [place] the night before the procedure    - Avoid red/purple fluids while completing their bowel prep as ordered by physician  -Contact Gastrointerologist office for any questions about specific details regarding colon prep    -You will need to have someone drive you home after your colonoscopy and remain with you for 24 hours after the procedure  - The date of your Surgery, you may have one visitor at bedside or within 10-15 minutes of [facility] [place]  -Please wear warm socks when you arrive for your colonoscopy  -Remove all jewelry and leave any valuables before arriving the day of your procedure (all will have to be removed before leaving preop)  -You will need to arrive at 0945  on 4/22 for your colonoscopy    -Feel free to contact us at: [phone] with any additional questions/concerns

## 2024-04-22 ENCOUNTER — ANESTHESIA (OUTPATIENT)
Dept: SURGERY | Facility: SURGERY CENTER | Age: 54
End: 2024-04-22
Payer: COMMERCIAL

## 2024-04-22 ENCOUNTER — ANESTHESIA EVENT (OUTPATIENT)
Dept: SURGERY | Facility: SURGERY CENTER | Age: 54
End: 2024-04-22
Payer: COMMERCIAL

## 2024-04-22 ENCOUNTER — HOSPITAL ENCOUNTER (OUTPATIENT)
Facility: SURGERY CENTER | Age: 54
Setting detail: HOSPITAL OUTPATIENT SURGERY
Discharge: HOME | End: 2024-04-22
Attending: INTERNAL MEDICINE
Payer: COMMERCIAL

## 2024-04-22 VITALS
RESPIRATION RATE: 16 BRPM | HEIGHT: 71 IN | OXYGEN SATURATION: 97 % | WEIGHT: 198 LBS | DIASTOLIC BLOOD PRESSURE: 54 MMHG | HEART RATE: 72 BPM | BODY MASS INDEX: 27.72 KG/M2 | SYSTOLIC BLOOD PRESSURE: 106 MMHG | TEMPERATURE: 97.7 F

## 2024-04-22 DIAGNOSIS — Z86.010 PERSONAL HISTORY OF COLONIC POLYPS: ICD-10-CM

## 2024-04-22 DIAGNOSIS — D12.2 ADENOMATOUS POLYP OF ASCENDING COLON: ICD-10-CM

## 2024-04-22 DIAGNOSIS — Z12.11 ENCOUNTER FOR SCREENING FOR MALIGNANT NEOPLASM OF COLON: ICD-10-CM

## 2024-04-22 PROCEDURE — 45380 COLONOSCOPY AND BIOPSY: CPT

## 2024-04-22 PROCEDURE — 25810000003 LACTATED RINGERS PER 1000 ML

## 2024-04-22 PROCEDURE — 45380 COLONOSCOPY AND BIOPSY: CPT | Performed by: INTERNAL MEDICINE

## 2024-04-22 PROCEDURE — 88305 TISSUE EXAM BY PATHOLOGIST: CPT | Performed by: INTERNAL MEDICINE

## 2024-04-22 PROCEDURE — 25010000002 LIDOCAINE 1 % SOLUTION: Performed by: ANESTHESIOLOGY

## 2024-04-22 PROCEDURE — 25010000002 PROPOFOL 10 MG/ML EMULSION: Performed by: ANESTHESIOLOGY

## 2024-04-22 RX ORDER — SODIUM CHLORIDE 0.9 % (FLUSH) 0.9 %
10 SYRINGE (ML) INJECTION AS NEEDED
Status: DISCONTINUED | OUTPATIENT
Start: 2024-04-22 | End: 2024-04-22 | Stop reason: HOSPADM

## 2024-04-22 RX ORDER — SODIUM CHLORIDE, SODIUM LACTATE, POTASSIUM CHLORIDE, CALCIUM CHLORIDE 600; 310; 30; 20 MG/100ML; MG/100ML; MG/100ML; MG/100ML
30 INJECTION, SOLUTION INTRAVENOUS CONTINUOUS PRN
Status: DISCONTINUED | OUTPATIENT
Start: 2024-04-22 | End: 2024-04-22 | Stop reason: HOSPADM

## 2024-04-22 RX ORDER — FLUMAZENIL 0.1 MG/ML
0.2 INJECTION INTRAVENOUS AS NEEDED
Status: DISCONTINUED | OUTPATIENT
Start: 2024-04-22 | End: 2024-04-22 | Stop reason: HOSPADM

## 2024-04-22 RX ORDER — DROPERIDOL 2.5 MG/ML
0.62 INJECTION, SOLUTION INTRAMUSCULAR; INTRAVENOUS
Status: DISCONTINUED | OUTPATIENT
Start: 2024-04-22 | End: 2024-04-22 | Stop reason: HOSPADM

## 2024-04-22 RX ORDER — FENTANYL CITRATE 50 UG/ML
50 INJECTION, SOLUTION INTRAMUSCULAR; INTRAVENOUS
Status: DISCONTINUED | OUTPATIENT
Start: 2024-04-22 | End: 2024-04-22 | Stop reason: HOSPADM

## 2024-04-22 RX ORDER — PROMETHAZINE HYDROCHLORIDE 25 MG/1
25 SUPPOSITORY RECTAL ONCE AS NEEDED
Status: DISCONTINUED | OUTPATIENT
Start: 2024-04-22 | End: 2024-04-22 | Stop reason: HOSPADM

## 2024-04-22 RX ORDER — DIPHENHYDRAMINE HYDROCHLORIDE 50 MG/ML
12.5 INJECTION INTRAMUSCULAR; INTRAVENOUS
Status: DISCONTINUED | OUTPATIENT
Start: 2024-04-22 | End: 2024-04-22 | Stop reason: HOSPADM

## 2024-04-22 RX ORDER — SODIUM CHLORIDE 0.9 % (FLUSH) 0.9 %
3 SYRINGE (ML) INJECTION EVERY 12 HOURS SCHEDULED
Status: DISCONTINUED | OUTPATIENT
Start: 2024-04-22 | End: 2024-04-22 | Stop reason: HOSPADM

## 2024-04-22 RX ORDER — PROMETHAZINE HYDROCHLORIDE 12.5 MG/1
25 TABLET ORAL ONCE AS NEEDED
Status: DISCONTINUED | OUTPATIENT
Start: 2024-04-22 | End: 2024-04-22 | Stop reason: HOSPADM

## 2024-04-22 RX ORDER — LIDOCAINE HYDROCHLORIDE 10 MG/ML
INJECTION, SOLUTION INFILTRATION; PERINEURAL AS NEEDED
Status: DISCONTINUED | OUTPATIENT
Start: 2024-04-22 | End: 2024-04-22 | Stop reason: SURG

## 2024-04-22 RX ORDER — ONDANSETRON 2 MG/ML
4 INJECTION INTRAMUSCULAR; INTRAVENOUS ONCE AS NEEDED
Status: DISCONTINUED | OUTPATIENT
Start: 2024-04-22 | End: 2024-04-22 | Stop reason: HOSPADM

## 2024-04-22 RX ORDER — NALOXONE HCL 0.4 MG/ML
0.2 VIAL (ML) INJECTION AS NEEDED
Status: DISCONTINUED | OUTPATIENT
Start: 2024-04-22 | End: 2024-04-22 | Stop reason: HOSPADM

## 2024-04-22 RX ADMIN — PROPOFOL 200 MCG/KG/MIN: 10 INJECTION, EMULSION INTRAVENOUS at 10:10

## 2024-04-22 RX ADMIN — LIDOCAINE HYDROCHLORIDE 40 MG: 10 INJECTION, SOLUTION INFILTRATION; PERINEURAL at 10:16

## 2024-04-22 RX ADMIN — SODIUM CHLORIDE, POTASSIUM CHLORIDE, SODIUM LACTATE AND CALCIUM CHLORIDE 30 ML/HR: 600; 310; 30; 20 INJECTION, SOLUTION INTRAVENOUS at 10:02

## 2024-04-22 NOTE — ANESTHESIA PREPROCEDURE EVALUATION
Anesthesia Evaluation     NPO Solid Status: > 8 hours             Airway   Mallampati: I  TM distance: >3 FB  Neck ROM: full  Dental - normal exam     Pulmonary - negative pulmonary ROS and normal exam   Cardiovascular - normal exam    (+) DVT resolved  (-) hypertension, past MI      Neuro/Psych  GI/Hepatic/Renal/Endo      Musculoskeletal     Abdominal    Substance History      OB/GYN          Other                    Anesthesia Plan    ASA 2     MAC       Anesthetic plan, risks, benefits, and alternatives have been provided, discussed and informed consent has been obtained with: patient.    CODE STATUS:

## 2024-04-22 NOTE — ANESTHESIA POSTPROCEDURE EVALUATION
Patient: Thomas Silvestre    Procedure Summary       Date: 04/22/24 Room / Location: [facility]  / [facility]    Anesthesia Start: 1006 Anesthesia Stop: 1034    Procedure: COLONOSCOPY TO CECUM FOR SCREENING WITH POLYPECTOMY Diagnosis:       Encounter for screening for malignant neoplasm of colon      Adenomatous polyp of ascending colon      Personal history of colonic polyps      (Encounter for screening for malignant neoplasm of colon [Z12.11])      (Adenomatous polyp of ascending colon [D12.2])      (Personal history of colonic polyps [Z86.010])    Surgeons: Thomas Kim MD Provider: Steve Rojas MD    Anesthesia Type: MAC ASA Status: 2            Anesthesia Type: MAC    Vitals  Vitals Value Taken Time   /54 04/22/24 1040   Temp 36.5 °C (97.7 °F) 04/22/24 1030   Pulse 72 04/22/24 1040   Resp 16 04/22/24 1040   SpO2 97 % 04/22/24 1040           Post Anesthesia Care and Evaluation    Patient location during evaluation: bedside  Pain management: adequate    Airway patency: patent  Anesthetic complications: No anesthetic complications    Cardiovascular status: acceptable  Respiratory status: acceptable  Hydration status: acceptable

## 2024-04-22 NOTE — H&P
No chief complaint on file.      HPI  H/o polyps         Problem List:    Patient Active Problem List   Diagnosis    Health care maintenance    Chronic fatigue    Seborrheic dermatitis    Seborrheic keratosis    Annual physical exam    Prostate cancer screening    Colon cancer screening    Encounter for special screening examination for cardiovascular disorder    Encounter for hepatitis C screening test for low risk patient    Positive colorectal cancer screening using Cologuard test    Acute deep vein thrombosis (DVT) of tibial vein    Adenomatous polyp of ascending colon    Encounter for screening for malignant neoplasm of colon    Personal history of colonic polyps       Medical History:    Past Medical History:   Diagnosis Date    DVT (deep venous thrombosis)         Social History:    Social History     Socioeconomic History    Marital status:      Spouse name: Shima    Number of children: 2    Years of education: College   Tobacco Use    Smoking status: Never    Smokeless tobacco: Never   Vaping Use    Vaping status: Never Used   Substance and Sexual Activity    Alcohol use: Yes     Comment: Occasional    Drug use: No    Sexual activity: Defer       Family History:   Family History   Problem Relation Age of Onset    Hypertension Father     Pulmonary embolism Brother        Surgical History:   Past Surgical History:   Procedure Laterality Date    KNEE ACL RECONSTRUCTION  2002    OTHER SURGICAL HISTORY      cyst removed    TOE SURGERY  2015    VASECTOMY         No current facility-administered medications for this encounter.    Current Outpatient Medications:     aspirin 81 MG chewable tablet, Chew 1 tablet Daily., Disp: , Rfl:     Enoxaparin Sodium (LOVENOX) 100 MG/ML solution prefilled syringe syringe, USE FOR LONG TRIPS OR AIRPLANE DAY OF DEPARTURE OF EACH PLACE, Disp: 10 mL, Rfl: 3    Allergies: No Known Allergies     The following portions of the patient's history were reviewed by me and updated as  appropriate: review of systems, allergies, current medications, past family history, past medical history, past social history, past surgical history and problem list.    There were no vitals filed for this visit.    PHYSICAL EXAM:    CONSTITUTIONAL:  today's vital signs reviewed by me  GASTROINTESTINAL: abdomen is soft nontender nondistended with normal active bowel sounds, no masses are appreciated    Assessment/ Plan  H/o polyps    colonoscopy    Risks and benefits as well as alternatives to endoscopic evaluation were explained to the patient and they voiced understanding and wish to proceed.  These risks include but are not limited to the risk of bleeding, perforation, adverse reaction to sedation, and missed lesions.  The patient was given the opportunity to ask questions prior to the endoscopic procedure.

## 2024-05-06 ENCOUNTER — OFFICE VISIT (OUTPATIENT)
Dept: ONCOLOGY | Facility: CLINIC | Age: 54
End: 2024-05-06
Payer: COMMERCIAL

## 2024-05-06 ENCOUNTER — LAB (OUTPATIENT)
Dept: LAB | Facility: HOSPITAL | Age: 54
End: 2024-05-06
Payer: COMMERCIAL

## 2024-05-06 VITALS
BODY MASS INDEX: 27.27 KG/M2 | SYSTOLIC BLOOD PRESSURE: 106 MMHG | RESPIRATION RATE: 16 BRPM | TEMPERATURE: 98.4 F | WEIGHT: 194.8 LBS | OXYGEN SATURATION: 99 % | HEIGHT: 71 IN | HEART RATE: 60 BPM | DIASTOLIC BLOOD PRESSURE: 72 MMHG

## 2024-05-06 DIAGNOSIS — I82.441 ACUTE DEEP VEIN THROMBOSIS (DVT) OF TIBIAL VEIN OF RIGHT LOWER EXTREMITY: ICD-10-CM

## 2024-05-06 DIAGNOSIS — I82.449 ACUTE DEEP VEIN THROMBOSIS (DVT) OF TIBIAL VEIN, UNSPECIFIED LATERALITY: Primary | ICD-10-CM

## 2024-05-06 LAB
BASOPHILS # BLD AUTO: 0.06 10*3/MM3 (ref 0–0.2)
BASOPHILS NFR BLD AUTO: 0.7 % (ref 0–1.5)
DEPRECATED RDW RBC AUTO: 44 FL (ref 37–54)
EOSINOPHIL # BLD AUTO: 0.15 10*3/MM3 (ref 0–0.4)
EOSINOPHIL NFR BLD AUTO: 1.8 % (ref 0.3–6.2)
ERYTHROCYTE [DISTWIDTH] IN BLOOD BY AUTOMATED COUNT: 13.3 % (ref 12.3–15.4)
HCT VFR BLD AUTO: 40.7 % (ref 37.5–51)
HGB BLD-MCNC: 14.1 G/DL (ref 13–17.7)
IMM GRANULOCYTES # BLD AUTO: 0.04 10*3/MM3 (ref 0–0.05)
IMM GRANULOCYTES NFR BLD AUTO: 0.5 % (ref 0–0.5)
LYMPHOCYTES # BLD AUTO: 2.01 10*3/MM3 (ref 0.7–3.1)
LYMPHOCYTES NFR BLD AUTO: 24.2 % (ref 19.6–45.3)
MCH RBC QN AUTO: 31.2 PG (ref 26.6–33)
MCHC RBC AUTO-ENTMCNC: 34.6 G/DL (ref 31.5–35.7)
MCV RBC AUTO: 90 FL (ref 79–97)
MONOCYTES # BLD AUTO: 0.73 10*3/MM3 (ref 0.1–0.9)
MONOCYTES NFR BLD AUTO: 8.8 % (ref 5–12)
NEUTROPHILS NFR BLD AUTO: 5.31 10*3/MM3 (ref 1.7–7)
NEUTROPHILS NFR BLD AUTO: 64 % (ref 42.7–76)
NRBC BLD AUTO-RTO: 0 /100 WBC (ref 0–0.2)
PLATELET # BLD AUTO: 276 10*3/MM3 (ref 140–450)
PMV BLD AUTO: 10 FL (ref 6–12)
RBC # BLD AUTO: 4.52 10*6/MM3 (ref 4.14–5.8)
WBC NRBC COR # BLD AUTO: 8.3 10*3/MM3 (ref 3.4–10.8)

## 2024-05-06 PROCEDURE — 99214 OFFICE O/P EST MOD 30 MIN: CPT | Performed by: INTERNAL MEDICINE

## 2024-05-06 PROCEDURE — 36415 COLL VENOUS BLD VENIPUNCTURE: CPT

## 2024-05-06 PROCEDURE — 85025 COMPLETE CBC W/AUTO DIFF WBC: CPT

## 2025-05-14 DIAGNOSIS — I82.449 ACUTE DEEP VEIN THROMBOSIS (DVT) OF TIBIAL VEIN, UNSPECIFIED LATERALITY: Primary | ICD-10-CM

## 2025-05-15 ENCOUNTER — OFFICE VISIT (OUTPATIENT)
Dept: ONCOLOGY | Facility: CLINIC | Age: 55
End: 2025-05-15
Payer: COMMERCIAL

## 2025-05-15 ENCOUNTER — LAB (OUTPATIENT)
Dept: LAB | Facility: HOSPITAL | Age: 55
End: 2025-05-15
Payer: COMMERCIAL

## 2025-05-15 VITALS
DIASTOLIC BLOOD PRESSURE: 74 MMHG | HEART RATE: 78 BPM | WEIGHT: 198.3 LBS | SYSTOLIC BLOOD PRESSURE: 108 MMHG | BODY MASS INDEX: 28.39 KG/M2 | TEMPERATURE: 97.7 F | HEIGHT: 70 IN | OXYGEN SATURATION: 97 %

## 2025-05-15 DIAGNOSIS — I82.449 ACUTE DEEP VEIN THROMBOSIS (DVT) OF TIBIAL VEIN, UNSPECIFIED LATERALITY: ICD-10-CM

## 2025-05-15 DIAGNOSIS — I82.4Z1 ACUTE DEEP VEIN THROMBOSIS OF CALF, RIGHT: Primary | ICD-10-CM

## 2025-05-15 LAB
ALBUMIN SERPL-MCNC: 4.5 G/DL (ref 3.5–5.2)
ALBUMIN/GLOB SERPL: 2.4 G/DL
ALP SERPL-CCNC: 66 U/L (ref 39–117)
ALT SERPL W P-5'-P-CCNC: 33 U/L (ref 1–41)
ANION GAP SERPL CALCULATED.3IONS-SCNC: 5.4 MMOL/L (ref 5–15)
AST SERPL-CCNC: 21 U/L (ref 1–40)
BASOPHILS # BLD AUTO: 0.06 10*3/MM3 (ref 0–0.2)
BASOPHILS NFR BLD AUTO: 1.1 % (ref 0–1.5)
BILIRUB SERPL-MCNC: 0.3 MG/DL (ref 0–1.2)
BUN SERPL-MCNC: 17 MG/DL (ref 6–20)
BUN/CREAT SERPL: 16.2 (ref 7–25)
CALCIUM SPEC-SCNC: 9.2 MG/DL (ref 8.6–10.5)
CHLORIDE SERPL-SCNC: 106 MMOL/L (ref 98–107)
CO2 SERPL-SCNC: 29.6 MMOL/L (ref 22–29)
CREAT SERPL-MCNC: 1.05 MG/DL (ref 0.76–1.27)
DEPRECATED RDW RBC AUTO: 44.3 FL (ref 37–54)
EGFRCR SERPLBLD CKD-EPI 2021: 82.8 ML/MIN/1.73
EOSINOPHIL # BLD AUTO: 0.17 10*3/MM3 (ref 0–0.4)
EOSINOPHIL NFR BLD AUTO: 3.1 % (ref 0.3–6.2)
ERYTHROCYTE [DISTWIDTH] IN BLOOD BY AUTOMATED COUNT: 12.9 % (ref 12.3–15.4)
GLOBULIN UR ELPH-MCNC: 1.9 GM/DL
GLUCOSE SERPL-MCNC: 105 MG/DL (ref 65–99)
HCT VFR BLD AUTO: 42.1 % (ref 37.5–51)
HGB BLD-MCNC: 14 G/DL (ref 13–17.7)
IMM GRANULOCYTES # BLD AUTO: 0.02 10*3/MM3 (ref 0–0.05)
IMM GRANULOCYTES NFR BLD AUTO: 0.4 % (ref 0–0.5)
LYMPHOCYTES # BLD AUTO: 1.59 10*3/MM3 (ref 0.7–3.1)
LYMPHOCYTES NFR BLD AUTO: 28.8 % (ref 19.6–45.3)
MCH RBC QN AUTO: 31 PG (ref 26.6–33)
MCHC RBC AUTO-ENTMCNC: 33.3 G/DL (ref 31.5–35.7)
MCV RBC AUTO: 93.3 FL (ref 79–97)
MONOCYTES # BLD AUTO: 0.55 10*3/MM3 (ref 0.1–0.9)
MONOCYTES NFR BLD AUTO: 10 % (ref 5–12)
NEUTROPHILS NFR BLD AUTO: 3.13 10*3/MM3 (ref 1.7–7)
NEUTROPHILS NFR BLD AUTO: 56.6 % (ref 42.7–76)
NRBC BLD AUTO-RTO: 0 /100 WBC (ref 0–0.2)
PLATELET # BLD AUTO: 316 10*3/MM3 (ref 140–450)
PMV BLD AUTO: 9.1 FL (ref 6–12)
POTASSIUM SERPL-SCNC: 4.8 MMOL/L (ref 3.5–5.2)
PROT SERPL-MCNC: 6.4 G/DL (ref 6–8.5)
RBC # BLD AUTO: 4.51 10*6/MM3 (ref 4.14–5.8)
SODIUM SERPL-SCNC: 141 MMOL/L (ref 136–145)
WBC NRBC COR # BLD AUTO: 5.52 10*3/MM3 (ref 3.4–10.8)

## 2025-05-15 PROCEDURE — 80053 COMPREHEN METABOLIC PANEL: CPT

## 2025-05-15 PROCEDURE — 36415 COLL VENOUS BLD VENIPUNCTURE: CPT

## 2025-05-15 PROCEDURE — 85025 COMPLETE CBC W/AUTO DIFF WBC: CPT

## 2025-05-15 NOTE — PROGRESS NOTES
"[facility] GROUP OUTPATIENT FOLLOW UP CLINIC VISIT    REASON FOR FOLLOW-UP:    *Right lower extremity gastrocnemius venous thrombosis    HISTORY OF PRESENT ILLNESS:  Thomas Silvestre is a 55 y.o. male who returns today for follow up of the above issue.      Patient returns the office today, 5/15/2025 for 1 year follow-up and review.  He was originally seen in consultation by Dr. Steele in 2019 after a prolonged car trip driving from [place] resulted in a calf vein thrombosis.  He completed anticoagulation with Xarelto with a negative thrombophilia workup.  He has since been off anticoagulation utilizing only intermittent Lovenox with prolonged travel.  He denies any new concerns today.  He has no calf pain, shortness of breath    REVIEW OF SYSTEMS:  ROS as per HPI    PHYSICAL EXAMINATION:    Vitals:    05/15/25 0820   BP: 108/74   Pulse: 78   Temp: 97.7 °F (36.5 °C)   TempSrc: Temporal   SpO2: 97%   Weight: 89.9 kg (198 lb 4.8 oz)   Height: 177.8 cm (70\")   PainSc: 0-No pain       PHYSICAL EXAM  General:  No acute distress, awake, alert and oriented  Skin:  Warm and dry, no visible rash  HEENT:  Normocephalic/atraumatic.  Wearing a face mask.  Chest:  Normal respiratory effort  Extremities:  No visible clubbing, cyanosis, or edema  Neuro/psych:  Grossly nonfocal.  Normal mood and affect.        DIAGNOSTIC DATA:  CBC & Differential (05/15/2025 07:31)  Comprehensive Metabolic Panel (05/15/2025 07:31)    IMAGING:    Duplex Venous Lower Extremity - Right CAR (06/12/2019 15:50)  Duplex Venous Lower Extremity - Right CAR (11/30/2016 17:00)    ASSESSMENT:  This is a 55 y.o. male with:    *Right lower extremity gastrocnemius thrombosis  August 2019 patient seen in consultation for a new right lower extremity gastrocnemius thrombosis after a prolonged car trip to [place]  3 months of anticoagulation with Xarelto  Thrombophilia workup negative including factor V Leiden mutation, prothrombin gene mutation, protein C, " protein S, Antithrombin III, antibeta glycoprotein antibody and anticardiolipin antibodies.  This was felt to be a provoked thrombosis  After completing anticoagulation, Dr. Steele did initially utilize therapeutic Lovenox the day of prolonged travel  Patient seen 5/15/2025, new to me as his provider.  We have reviewed previous calf vein thrombosis (no extention to the deep vein) as well as negative thrombophilia workup.  He has been off anticoagulation for over 5 years.  We have discussed utilizing compression stockings, up and moving every 2-3 hours with prolonged travel and a full dose aspirin, 325 mg a day of travel.  Anticoagulation Lovenox is not necessary given his negative thrombophilia workup.    *Question regarding the use of testosterone  The patient questions the use of testosterone for increase in his energy.  We have reviewed testosterone does have increased risk of thrombophilia.  His previous calf vein thrombosis which was provoked is not an absolute contraindication.  I encouraged him to discuss with his primary care lab evaluation of his testosterone prior to beginning any supplementation.  Pending his lab results he would have to weigh the risks and benefits of utilizing testosterone supplementation.    PLAN:  Patient will not require hematologic follow-up.  As outlined above we reviewed prophylactic full dose aspirin the days of travel as well as compression stockings.  If the patient were to develop recurrent thrombosis we will gladly see the patient in consultation, he will otherwise follow-up with his primary care provider.    I spent 30 minutes caring for Thomas on this date of service. This time includes time spent by me in the following activities: preparing for the visit, reviewing tests, obtaining and/or reviewing a separately obtained history, counseling and educating the patient/family/caregiver, referring and communicating with other health care professionals, documenting information  in the medical record, and care coordination.       Radha Cordero, APRN  05/15/2025

## (undated) DEVICE — FLEX ADVANTAGE 1500CC: Brand: FLEX ADVANTAGE

## (undated) DEVICE — CANN O2 ETCO2 FITS ALL CONN CO2 SMPL A/ 7IN DISP LF

## (undated) DEVICE — SINGLE-USE BIOPSY FORCEPS: Brand: RADIAL JAW 4

## (undated) DEVICE — JACKT LAB F/R KNIT CUFF/COLR XLG BLU

## (undated) DEVICE — VIAL FORMLN CAP 10PCT 20ML

## (undated) DEVICE — GOWN PROC ENDOARMOR GI LVL3 HY/SHLD UNIV

## (undated) DEVICE — KT ORCA ORCAPOD DISP STRL

## (undated) DEVICE — Device

## (undated) DEVICE — ADAPT CLN SCPE ENDO PORPOISE BX/50 DISP